# Patient Record
Sex: MALE | Race: WHITE | NOT HISPANIC OR LATINO | Employment: OTHER | URBAN - NONMETROPOLITAN AREA
[De-identification: names, ages, dates, MRNs, and addresses within clinical notes are randomized per-mention and may not be internally consistent; named-entity substitution may affect disease eponyms.]

---

## 2017-04-14 ENCOUNTER — OFFICE VISIT (OUTPATIENT)
Dept: CARDIOLOGY | Facility: CLINIC | Age: 62
End: 2017-04-14

## 2017-04-14 VITALS
SYSTOLIC BLOOD PRESSURE: 122 MMHG | DIASTOLIC BLOOD PRESSURE: 80 MMHG | BODY MASS INDEX: 32.51 KG/M2 | HEART RATE: 80 BPM | HEIGHT: 72 IN | WEIGHT: 240 LBS

## 2017-04-14 DIAGNOSIS — I49.5 SSS (SICK SINUS SYNDROME) (HCC): Primary | ICD-10-CM

## 2017-04-14 DIAGNOSIS — I36.1 NON-RHEUMATIC TRICUSPID VALVE INSUFFICIENCY: ICD-10-CM

## 2017-04-14 DIAGNOSIS — R55 VASOVAGAL SYNCOPE: ICD-10-CM

## 2017-04-14 DIAGNOSIS — I48.0 PAROXYSMAL A-FIB (HCC): ICD-10-CM

## 2017-04-14 PROCEDURE — 99213 OFFICE O/P EST LOW 20 MIN: CPT | Performed by: INTERNAL MEDICINE

## 2017-04-14 RX ORDER — ASPIRIN 325 MG
325 TABLET ORAL DAILY
COMMUNITY
End: 2020-06-02 | Stop reason: HOSPADM

## 2017-04-14 NOTE — PROGRESS NOTES
Baljinder Blanca  61 y.o. male    04/14/2017  1. SSS (sick sinus syndrome)    2. Paroxysmal a-fib    3. Vasovagal syncope    4. Non-rheumatic tricuspid valve insufficiency        History of Present Illness: Routine follow-up    61 years old patient with a past medical history of sick sinus syndrome status post pacemaker implantation.  History of for near syncopal episode in upright postures got better with increasing water and salt intake.  Recent pacemaker interrogation of good sensing and pacing threshold.  He had history of varicose vein, ablation by  Dr. Deny Freitas had some trouble and evaluated by me in clinic [place] and scheduled to have procedure done next month.  She denies orthopnea, PND, nauseous, vomiting, diarrhea.  Last echocardiographic study normal left and a systolic function with a mild tricuspid regurgitation without any hemodynamic significance.            SUBJECTIVE    No Known Allergies      History reviewed. No pertinent past medical history.      Past Surgical History:   Procedure Laterality Date   • GALLBLADDER SURGERY     • INSERT / REPLACE / REMOVE PACEMAKER     • SKIN LESION EXCISION     • VASECTOMY     • VEIN SURGERY           No family history on file.      Social History     Social History   • Marital status:      Spouse name: N/A   • Number of children: N/A   • Years of education: N/A     Occupational History   • Not on file.     Social History Main Topics   • Smoking status: Never Smoker   • Smokeless tobacco: Current User     Types: Snuff   • Alcohol use No   • Drug use: No   • Sexual activity: Defer     Other Topics Concern   • Not on file     Social History Narrative   • No narrative on file         Current Outpatient Prescriptions   Medication Sig Dispense Refill   • aspirin 325 MG tablet Take 325 mg by mouth Daily.     • dronedarone (MULTAQ) 400 MG tablet Take 1 tablet by mouth 2 (Two) Times a Day. 60 tablet 6   • Multiple Vitamins-Minerals (MULTIVITAL PO) Take 1  "tablet by mouth Daily.       No current facility-administered medications for this visit.          OBJECTIVE    /80  Pulse 80  Ht 72\" (182.9 cm)  Wt 240 lb (109 kg)  BMI 32.55 kg/m2        Review of Systems     Constitutional:  Denies recent weight loss, weight gain, fever or chills, no change in exercise tolerance     HENT:  Denies any hearing loss, epistaxis, hoarseness, or difficulty speaking.     Eyes: No blurring      Respiratory:  Denies dyspnea with exertion,no cough, wheezing, or hemoptysis.     Cardiovascular: The H&P     Gastrointestinal:  Denies change in bowel habits, dyspepsia, ulcer disease, hematochezia, or melena.     Endocrine: Negative for cold intolerance, heat intolerance, polydipsia, polyphagia and polyuria. Denies any history of weight change, heat/cold intolerance, polydipsia, polyuria     Genitourinary: Negative.      Musculoskeletal: Denies any history of arthritic symptoms or back problems     Skin:  Denies any change in hair or nails, rashes, or skin lesions.     Allergic/Immunologic: Negative.  Negative for environmental allergies, food allergies and immunocompromised state.     Neurological:  Denies any history of recurrent headaches, strokes, TIA, or seizure disorder.     Hematological: Denies any food allergies, seasonal allergies, bleeding disorders, or lymphadenopathy.     Psychiatric/Behavioral: Denies any history of depression, substance abuse, or change in cognitive function.         Physical Exam     Constitutional: Cooperative, alert and oriented, well-developed, well-nourished, in no acute distress.     HENT:   Head: Normocephalic, normal hair patterns, no masses or tenderness.  Ears, Nose, and Throat: No gross abnormalities. No pallor or cyanosis. Dentition good.   Eyes: EOMS intact, PERRL, conjunctivae and lids unremarkable. Fundoscopic exam and visual fields not performed.   Neck: No palpable masses or adenopathy, no thyromegaly, no JVD, carotid pulses are full and " equal bilaterally and without  Bruits.     Cardiovascular: Regular rhythm, S1 and S2 normal, no S3 or S4. Apical impulse not displaced. No murmurs, gallops, or rubs detected.     Pulmonary/Chest: Chest: normal symmetry, no tenderness to palpation, normal respiratory excursion, no intercostal retraction, no use of accessory muscles.            Pulmonary: Normal breath sounds. No rales or ronchi.    Abdominal: Abdomen soft, bowel sounds normoactive, no masses, no hepatosplenomegaly, non-tender, no bruits.     Musculoskeletal: No deformities, clubbing, cyanosis, erythema, or edema observed. There are no spinal abnormalities noted. Normal muscle strength and tone. Pulses full and equal in all extremities, no bruits auscultated.     Neurological: No gross motor or sensory deficits noted, affect appropriate, oriented to time, person, place.     Skin: Warm and dry to the touch, no apparent skin lesions or masses noted.     Psychiatric: He has a normal mood and affect. His behavior is normal. Judgment and thought content normal.         Procedures      No results found for: WBC, HGB, HCT, MCV, PLT  Lab Results   Component Value Date    ALBUMIN 4.5 01/29/2016    AST 33 01/29/2016    ALT 26 01/29/2016     No results found for: CHOL  No results found for: TRIG  No results found for: HDL  No results found for: LDLCALC  No results found for: LDL  No results found for: HDLLDLRATIO  No components found for: CHOLHDL  No results found for: HGBA1C  Lab Results   Component Value Date    TSH 1.97 01/29/2016           ASSESSMENT AND PLAN  #1sick sinus syndrome status post pacemaker implantation #2 paroxysmal atrial fibrillation continued sinus rhythm.    Clinically there is no sign of any acute cardiac decompensation based the clinical history physical finding.  Evidence of active ischemia.  Dizziness got significant better of for after salt and water intake.  He will continue his present medication which Multaq to keep  sinus rhythm.   No change was made in the medical management as his cardiac status seemed to well compensated.  We'll arrange hepatic profile in 6 month    Diagnoses and all orders for this visit:    SSS (sick sinus syndrome)    Paroxysmal a-fib    Vasovagal syncope    Non-rheumatic tricuspid valve insufficiency        Gautam Ramos MD  4/14/2017  9:45 AM

## 2017-06-28 ENCOUNTER — CLINICAL SUPPORT (OUTPATIENT)
Dept: CARDIOLOGY | Facility: CLINIC | Age: 62
End: 2017-06-28

## 2017-06-28 DIAGNOSIS — I49.5 SSS (SICK SINUS SYNDROME) (HCC): Primary | ICD-10-CM

## 2017-06-28 PROCEDURE — 93288 INTERROG EVL PM/LDLS PM IP: CPT | Performed by: INTERNAL MEDICINE

## 2017-06-28 NOTE — PROGRESS NOTES
62 years old patient with a sick sinus syndrome status post pacemaker implantation.  Manufacture [employer] model number p.m. 2210 and serial number [id number].  Implantation date 8/2/2011 and date of interrogation 6/28/2017.  Battery voltage good for 2-4 year.  Pacing the atrium about 10% ventricle 50% of the time.  Pacing programmed to DDD at 60 and 120.  AV delay 250 and PV delay to 25.  Sensing P wave 1 sensing R wave 9 with a lead impedance 390 threshold 1.2 at 0.5.  Had history of paroxysmal 3 fibrillation and currently in A. fib.  Otherwise normal functioning pacemaker and an pacemaker programmed from DDD to VVI

## 2017-09-13 ENCOUNTER — CLINICAL SUPPORT (OUTPATIENT)
Dept: CARDIOLOGY | Facility: CLINIC | Age: 62
End: 2017-09-13

## 2017-09-13 ENCOUNTER — DOCUMENTATION (OUTPATIENT)
Dept: CARDIOLOGY | Facility: CLINIC | Age: 62
End: 2017-09-13

## 2017-09-13 DIAGNOSIS — I49.5 SSS (SICK SINUS SYNDROME) (HCC): Primary | ICD-10-CM

## 2017-09-13 PROCEDURE — 93288 INTERROG EVL PM/LDLS PM IP: CPT | Performed by: INTERNAL MEDICINE

## 2017-09-18 NOTE — PROGRESS NOTES
Baljinder Blanca is a 62-year-old male with sick sinus syndrome status post pacemaker implantation on 8/2/2011  The patient has a [employer]ent DR RF 2210 pacemaker  Battery status was adequate at 2.9 to be an estimated longevity was 7.6-8.2 years.  The pacing mode was VVIR rate 60 bpm.  Ventricular lead sensing was 11.5 mV and threshold 1V at 0.5 ms and impedance 440 ohms.  No atrial or ventricular episodes were noted.  [employer] security upgrade was completed.  No changes made.  Pacemaker functioning well.

## 2017-10-13 ENCOUNTER — OFFICE VISIT (OUTPATIENT)
Dept: CARDIOLOGY | Facility: CLINIC | Age: 62
End: 2017-10-13

## 2017-10-13 VITALS
WEIGHT: 237 LBS | HEIGHT: 72 IN | SYSTOLIC BLOOD PRESSURE: 120 MMHG | HEART RATE: 60 BPM | DIASTOLIC BLOOD PRESSURE: 75 MMHG | BODY MASS INDEX: 32.1 KG/M2

## 2017-10-13 DIAGNOSIS — I49.5 SSS (SICK SINUS SYNDROME) (HCC): Primary | ICD-10-CM

## 2017-10-13 DIAGNOSIS — I48.0 PAROXYSMAL A-FIB (HCC): ICD-10-CM

## 2017-10-13 DIAGNOSIS — I36.1 NON-RHEUMATIC TRICUSPID VALVE INSUFFICIENCY: ICD-10-CM

## 2017-10-13 DIAGNOSIS — R55 VASOVAGAL SYNCOPE: ICD-10-CM

## 2017-10-13 PROCEDURE — 99213 OFFICE O/P EST LOW 20 MIN: CPT | Performed by: INTERNAL MEDICINE

## 2017-10-13 PROCEDURE — 93000 ELECTROCARDIOGRAM COMPLETE: CPT | Performed by: INTERNAL MEDICINE

## 2017-10-13 RX ORDER — TAMSULOSIN HYDROCHLORIDE 0.4 MG/1
CAPSULE ORAL
Refills: 0 | COMMUNITY
Start: 2017-10-10 | End: 2018-04-13

## 2017-10-13 NOTE — PROGRESS NOTES
Baljinder Blanca  62 y.o. male    10/13/2017  1. SSS (sick sinus syndrome)    2. Paroxysmal a-fib    3. Vasovagal syncope    4. Non-rheumatic tricuspid valve insufficiency        History of Present Illness    61 years old patient with a past medical history of sick sinus syndrome status post pacemaker implantation.  History of for near syncopal episode in upright postures got better with increasing water and salt intake.  Recent pacemaker interrogation of good sensing and pacing threshold.  He had history of varicose vein, ablation by  Dr. Deny Freitas had some trouble and evaluated in [facility] and ablation performed with a significant improvement in leg complaint.   She denies orthopnea, PND, nauseous, vomiting, diarrhea.  Last echocardiographic study normal left systolic function with  mild tricuspid regurgitation without any hemodynamic significance.        SUBJECTIVE    No Known Allergies      Past Medical History:   Diagnosis Date   • Arrhythmia    • Atrial fibrillation    • SSS (sick sinus syndrome)          Past Surgical History:   Procedure Laterality Date   • GALLBLADDER SURGERY     • INSERT / REPLACE / REMOVE PACEMAKER     • SKIN LESION EXCISION     • VASECTOMY     • VEIN SURGERY           Family History   Problem Relation Age of Onset   • No Known Problems Mother    • No Known Problems Father          Social History     Social History   • Marital status:      Spouse name: N/A   • Number of children: N/A   • Years of education: N/A     Occupational History   • Not on file.     Social History Main Topics   • Smoking status: Never Smoker   • Smokeless tobacco: Current User     Types: Snuff   • Alcohol use No   • Drug use: No   • Sexual activity: Defer     Other Topics Concern   • Not on file     Social History Narrative         Current Outpatient Prescriptions   Medication Sig Dispense Refill   • aspirin 325 MG tablet Take 325 mg by mouth Daily.     • dronedarone (MULTAQ) 400 MG tablet Take  "1 tablet by mouth 2 (Two) Times a Day. 60 tablet 6   • Multiple Vitamins-Minerals (MULTIVITAL PO) Take 1 tablet by mouth Daily.     • tamsulosin (FLOMAX) 0.4 MG capsule 24 hr capsule   0     No current facility-administered medications for this visit.          OBJECTIVE    /75  Pulse 60  Ht 72\" (182.9 cm)  Wt 237 lb (108 kg)  BMI 32.14 kg/m2        Review of Systems     Constitutional:  Denies recent weight loss, weight gain, fever or chills, no change in exercise tolerance     HENT:  Denies any hearing loss, epistaxis, hoarseness, or difficulty speaking.     Eyes: Wears eyeglasses or contact lenses     Respiratory:  Denies dyspnea with exertion,no cough, wheezing, or hemoptysis.     Cardiovascular: See H&P     Gastrointestinal:  Denies change in bowel habits, dyspepsia, ulcer disease, hematochezia, or melena.     Endocrine: Negative for cold intolerance, heat intolerance, polydipsia, polyphagia and polyuria. Denies any history of weight change, heat/cold intolerance, polydipsia, polyuria     Genitourinary: Negative.      Musculoskeletal: History of osteoarthritis    Skin:  Denies any change in hair or nails, rashes, or skin lesions.     Allergic/Immunologic: Negative.  Negative for environmental allergies, food allergies and immunocompromised state.     Neurological:  Denies any history of recurrent headaches, strokes, TIA, or seizure disorder.     Hematological: Denies any food allergies, seasonal allergies, bleeding disorders, or lymphadenopathy.     Psychiatric/Behavioral: Denies any history of depression, substance abuse, or change in cognitive function.         Physical Exam     Constitutional: Cooperative, alert and oriented, well-developed, well-nourished, in no acute distress.     HENT:   Head: Normocephalic, normal hair patterns, no masses or tenderness.  Ears, Nose, and Throat: No gross abnormalities. No pallor or cyanosis. Dentition good.   Eyes: EOMS intact, PERRL, conjunctivae and lids " unremarkable. Fundoscopic exam and visual fields not performed.   Neck: No palpable masses or adenopathy, no thyromegaly, no JVD, carotid pulses are full and equal bilaterally and without  Bruits.     Cardiovascular: Regular rhythm, S1 and S2 normal, no S3 or S4. Apical impulse not displaced. No murmurs, gallops, or rubs detected.     Pulmonary/Chest: Chest: normal symmetry, no tenderness to palpation, normal respiratory excursion, no intercostal retraction, no use of accessory muscles.            Pulmonary: Normal breath sounds. No rales or ronchi.    Abdominal: Abdomen soft, bowel sounds normoactive, no masses, no hepatosplenomegaly, non-tender, no bruits.     Musculoskeletal: No deformities, clubbing, cyanosis, erythema, or edema observed. There are no spinal abnormalities noted. Normal muscle strength and tone. Pulses full and equal in all extremities, no bruits auscultated.     Neurological: No gross motor or sensory deficits noted, affect appropriate, oriented to time, person, place.     Skin: Warm and dry to the touch, no apparent skin lesions or masses noted.     Psychiatric: He has a normal mood and affect. His behavior is normal. Judgment and thought content normal.           ECG 12 Lead  Date/Time: 10/13/2017 8:48 AM  Performed by: DMITRY BAUTISTA  Authorized by: DMITRY BAUTISTA   Comparison: not compared with previous ECG   Rhythm: sinus rhythm and paced              No results found for: WBC, HGB, HCT, MCV, PLT  Lab Results   Component Value Date    ALBUMIN 4.5 01/29/2016    AST 33 01/29/2016    ALT 26 01/29/2016     No results found for: CHOL  No results found for: TRIG  No results found for: HDL  No results found for: LDLCALC  No results found for: LDL  No results found for: HDLLDLRATIO  No components found for: CHOLHDL  No results found for: HGBA1C  Lab Results   Component Value Date    TSH 1.97 01/29/2016           ASSESSMENT AND PLAN  #1sick sinus syndrome status post pacemaker implantation #2  paroxysmal atrial fibrillation continued sinus rhythm.     Clinically there is no sign of any acute cardiac decompensation based the clinical history physical finding.  No evidence of active ischemia.  Dizziness got significant better of for after salt and water intake.  He will continue his present medication which Multaq to keep  sinus rhythm.  No change was made in the medical management as his cardiac status seemed to well compensated.   continue aspirin given the history of paroxysmal atrial fibrillation and low Victor Manuel vas Scoring.  Continue Multaq for management of paroxysmal atrial fibrillation currently sinus rhythm.  Lifestyle modification discussed with the patient.Dash diet explained    Baljinder was seen today for follow-up.    Diagnoses and all orders for this visit:    SSS (sick sinus syndrome)  -     ECG 12 Lead    Paroxysmal a-fib  -     ECG 12 Lead    Vasovagal syncope    Non-rheumatic tricuspid valve insufficiency        Gautam Ramos MD  10/13/2017  8:45 AM

## 2018-03-14 ENCOUNTER — CLINICAL SUPPORT (OUTPATIENT)
Dept: CARDIOLOGY | Facility: CLINIC | Age: 63
End: 2018-03-14

## 2018-03-14 DIAGNOSIS — Z95.0 PACEMAKER: ICD-10-CM

## 2018-03-14 DIAGNOSIS — I49.5 SSS (SICK SINUS SYNDROME) (HCC): Primary | ICD-10-CM

## 2018-03-14 PROCEDURE — 93288 INTERROG EVL PM/LDLS PM IP: CPT | Performed by: NURSE PRACTITIONER

## 2018-03-14 NOTE — PROGRESS NOTES
Pacemaker Evaluation Report    March 14, 2018    Primary Cardiologist: Richard  Implanting MD: Richard  : [employer]  Model: Accent DR RF 2210 Serial Number: [id number]  Implant date: 8/2/2011    Reason for evaluation:Routine  Cardiac device indication(s): SSS    Battery  HUMA: 2.92V   7.5-7.9 years   Last charge: N/A    Interrogation Results  Ventricular sensing: R wave: 9.6 mV  Ventricular capture: 1.25 V @ 0.5 ms  Ventricular lead impedance: right  430 ohms;     Parameters  Mode: VVIR  Base Rate: 60    Diagnostic Data  Ventricular paced: 29%  Mode switch: N/A    Changes made: Device  upgrade    Conclusions: 1 HVR episode 25 beats RVR on 9/30/17    Assessment:  1. SSS (sick sinus syndrome)    2. Pacemaker               This document has been electronically signed by MIGUE Curtis on March 14, 2018 4:54 PM

## 2018-04-13 ENCOUNTER — OFFICE VISIT (OUTPATIENT)
Dept: CARDIOLOGY | Facility: CLINIC | Age: 63
End: 2018-04-13

## 2018-04-13 VITALS
DIASTOLIC BLOOD PRESSURE: 80 MMHG | WEIGHT: 240 LBS | HEART RATE: 72 BPM | SYSTOLIC BLOOD PRESSURE: 128 MMHG | HEIGHT: 72 IN | BODY MASS INDEX: 32.51 KG/M2

## 2018-04-13 DIAGNOSIS — I48.0 PAROXYSMAL A-FIB (HCC): Primary | ICD-10-CM

## 2018-04-13 DIAGNOSIS — I36.1 NON-RHEUMATIC TRICUSPID VALVE INSUFFICIENCY: ICD-10-CM

## 2018-04-13 DIAGNOSIS — I49.5 SSS (SICK SINUS SYNDROME) (HCC): ICD-10-CM

## 2018-04-13 DIAGNOSIS — R55 VASOVAGAL SYNCOPE: ICD-10-CM

## 2018-04-13 PROCEDURE — 99213 OFFICE O/P EST LOW 20 MIN: CPT | Performed by: INTERNAL MEDICINE

## 2018-04-13 PROCEDURE — 93000 ELECTROCARDIOGRAM COMPLETE: CPT | Performed by: INTERNAL MEDICINE

## 2018-04-13 NOTE — PROGRESS NOTES
Baljinder Blanca  62 y.o. male    04/13/2018  1. Paroxysmal a-fib    2. Vasovagal syncope    3. Non-rheumatic tricuspid valve insufficiency    4. SSS (sick sinus syndrome)        History of Present Illness      62 years old patient with a past medical history of sick sinus syndrome status post pacemaker implantation.  History of for near syncopal episode in upright postures got better with increasing water and salt intake.  .  He had history of varicose vein, ablation by  Dr. Deny Freitas had some trouble and evaluated in [facility] and ablation performed with a significant improvement in leg complaint.   he denies orthopnea, PND, nauseous, vomiting, diarrhea.  Last echocardiographic study 2015 normal left systolic function with  mild tricuspid regurgitation without any hemodynamic significance.EKG done and finding discussed with the patient.  1/2016  Total Protein 6.3 - 8.6 gm/dl 7.8    Albumin 3.4 - 4.8 gm/dl 4.5    Bilirubin, Direct 0.0 - 0.3 mg/dl 0.0    Total Bilirubin 0.2 - 1.3 mg/dl 0.6    Alkaline Phosphatase 38 - 126 U/L 115    AST (SGOT) 17 - 59 U/L 33    ALT (SGPT) 21 - 72 U/L 26    Resulting Agency  [facility]       SUBJECTIVE    No Known Allergies      Past Medical History:   Diagnosis Date   • Arrhythmia    • Atrial fibrillation    • SSS (sick sinus syndrome)          Past Surgical History:   Procedure Laterality Date   • GALLBLADDER SURGERY     • INSERT / REPLACE / REMOVE PACEMAKER     • SKIN LESION EXCISION     • VASECTOMY     • VEIN SURGERY           Family History   Problem Relation Age of Onset   • No Known Problems Mother    • No Known Problems Father          Social History     Social History   • Marital status:      Spouse name: N/A   • Number of children: N/A   • Years of education: N/A     Occupational History   • Not on file.     Social History Main Topics   • Smoking status: Never Smoker   • Smokeless tobacco: Current User     Types: Snuff   • Alcohol use No   • Drug use: No  "  • Sexual activity: Defer     Other Topics Concern   • Not on file     Social History Narrative   • No narrative on file         Current Outpatient Prescriptions   Medication Sig Dispense Refill   • aspirin 325 MG tablet Take 325 mg by mouth Daily.     • dronedarone (MULTAQ) 400 MG tablet Take 1 tablet by mouth 2 (Two) Times a Day. 60 tablet 6   • Multiple Vitamins-Minerals (MULTIVITAL PO) Take 1 tablet by mouth Daily.       No current facility-administered medications for this visit.          OBJECTIVE    /80   Pulse 72   Ht 182.9 cm (72\")   Wt 109 kg (240 lb)   BMI 32.55 kg/m²         Review of Systems     Constitutional:  Denies recent weight loss, weight gain, fever or chills, no change in exercise tolerance     HENT:  Denies any hearing loss, epistaxis, hoarseness, or difficulty speaking.     Eyes: Wears eyeglasses or contact lenses     Respiratory:  Denies dyspnea with exertion,no cough, wheezing, or hemoptysis.     Cardiovascular: Negative for palpations, chest pain, orthopnea, PND, peripheral edema, syncope, or claudication.     Gastrointestinal:  Denies change in bowel habits, dyspepsia, ulcer disease, hematochezia, or melena.     Endocrine: Negative for cold intolerance, heat intolerance, polydipsia, polyphagia and polyuria. Denies any history of weight change, heat/cold intolerance, polydipsia, polyuria     Genitourinary: Negative.      Musculoskeletal: Denies any history of arthritic symptoms or back problems     Skin:  Denies any change in hair or nails, rashes, or skin lesions.     Allergic/Immunologic: Negative.  Negative for environmental allergies, food allergies and immunocompromised state.     Neurological:  Denies any history of recurrent headaches, strokes, TIA, or seizure disorder.     Hematological: Denies any food allergies, seasonal allergies, bleeding disorders, or lymphadenopathy.     Psychiatric/Behavioral: Denies any history of depression, substance abuse, or change in " cognitive function.         Physical Exam     Constitutional: Cooperative, alert and oriented, well-developed, well-nourished, in no acute distress.     HENT:   Head: Normocephalic, normal hair patterns, no masses or tenderness.  Ears, Nose, and Throat: No gross abnormalities. No pallor or cyanosis. Dentition good.   Eyes: EOMS intact, PERRL, conjunctivae and lids unremarkable. Fundoscopic exam and visual fields not performed.   Neck: No palpable masses or adenopathy, no thyromegaly, no JVD, carotid pulses are full and equal bilaterally and without  Bruits.     Cardiovascular: Regular rhythm, S1 and S2 normal, no S3 or S4. Apical impulse not displaced. No murmurs, gallops, or rubs detected.     Pulmonary/Chest: Chest: normal symmetry, no tenderness to palpation, normal respiratory excursion, no intercostal retraction, no use of accessory muscles.            Pulmonary: Normal breath sounds. No rales or ronchi.    Abdominal: Abdomen soft, bowel sounds normoactive, no masses, no hepatosplenomegaly, non-tender, no bruits.     Musculoskeletal: No deformities, clubbing, cyanosis, erythema, or edema observed. There are no spinal abnormalities noted. Normal muscle strength and tone. Pulses full and equal in all extremities, no bruits auscultated.     Neurological: No gross motor or sensory deficits noted, affect appropriate, oriented to time, person, place.     Skin: Warm and dry to the touch, no apparent skin lesions or masses noted.     Psychiatric: He has a normal mood and affect. His behavior is normal. Judgment and thought content normal.           ECG 12 Lead  Date/Time: 4/13/2018 8:45 AM  Performed by: DMITRY BAUTISTA  Authorized by: DMITRY BAUTISTA   Comparison: not compared with previous ECG   Rhythm: sinus rhythm              No results found for: WBC, HGB, HCT, MCV, PLT  Lab Results   Component Value Date    ALBUMIN 4.5 01/29/2016    AST 33 01/29/2016    ALT 26 01/29/2016     No results found for: CHOL  No  results found for: TRIG  No results found for: HDL  No components found for: LDLCALC  No results found for: LDL  No results found for: HDLLDLRATIO  No components found for: CHOLHDL  No results found for: HGBA1C  Lab Results   Component Value Date    TSH 1.97 01/29/2016           ASSESSMENT AND PLAN  #1sick sinus syndrome status post pacemaker implantation #2 paroxysmal atrial fibrillation continued sinus rhythm.     Clinically there is no sign of any acute cardiac decompensation based the clinical history physical finding.  No evidence of active ischemia.  Dizziness got significant better of for after salt and water intake.  He will continue his present medication which Multaq to keep  sinus rhythm.  No change was made in the medical management as his cardiac status seemed to well compensated.   continue aspirin given the history of paroxysmal atrial fibrillation and low Victor Manuel vas Scoring.  Continue Multaq for management of paroxysmal atrial fibrillation currently sinus rhythm.  Lifestyle modification discussed with the patient.Dash diet explained    Baljinder was seen today for follow-up.    Diagnoses and all orders for this visit:    Paroxysmal a-fib    Vasovagal syncope    Non-rheumatic tricuspid valve insufficiency    SSS (sick sinus syndrome)        Gautam Ramos MD  4/13/2018  8:40 AM

## 2018-04-25 RX ORDER — DRONEDARONE 400 MG/1
TABLET, FILM COATED ORAL
Qty: 60 TABLET | Refills: 6 | Status: SHIPPED | OUTPATIENT
Start: 2018-04-25 | End: 2018-06-29

## 2018-06-29 RX ORDER — FLECAINIDE ACETATE 50 MG/1
50 TABLET ORAL EVERY 12 HOURS
Qty: 60 TABLET | Refills: 6 | Status: SHIPPED | OUTPATIENT
Start: 2018-06-29 | End: 2018-10-17

## 2018-06-29 RX ORDER — DILTIAZEM HYDROCHLORIDE 120 MG/1
120 CAPSULE, COATED, EXTENDED RELEASE ORAL DAILY
COMMUNITY
End: 2018-06-29 | Stop reason: SDUPTHER

## 2018-06-29 RX ORDER — FLECAINIDE ACETATE 50 MG/1
50 TABLET ORAL EVERY 12 HOURS
COMMUNITY
End: 2018-06-29 | Stop reason: SDUPTHER

## 2018-06-29 RX ORDER — DILTIAZEM HYDROCHLORIDE 120 MG/1
120 CAPSULE, COATED, EXTENDED RELEASE ORAL DAILY
Qty: 30 CAPSULE | Refills: 6 | Status: SHIPPED | OUTPATIENT
Start: 2018-06-29 | End: 2019-04-23

## 2018-09-12 ENCOUNTER — CLINICAL SUPPORT (OUTPATIENT)
Dept: CARDIOLOGY | Facility: CLINIC | Age: 63
End: 2018-09-12

## 2018-09-12 DIAGNOSIS — I49.5 SSS (SICK SINUS SYNDROME) (HCC): Primary | ICD-10-CM

## 2018-09-12 DIAGNOSIS — Z95.0 PACEMAKER: ICD-10-CM

## 2018-09-12 PROCEDURE — 93288 INTERROG EVL PM/LDLS PM IP: CPT | Performed by: NURSE PRACTITIONER

## 2018-09-12 NOTE — PROGRESS NOTES
Pacemaker Evaluation Report    September 12, 2018    Primary Cardiologist: Dr Ramos  Implanting MD: Dr. Ramos  :[employer]    Model: Accent DR RF 2210  Serial Number: [id number]  Implant date: 8/2/2011    Reason for evaluation:routine  Office  PPM  Cardiac device indication(s): sick sinus syndrome    Battery  HUMA: 5.9-6.3 years 2.89v   Last charge: 0    Interrogation Results  Ventricular sensing: R wave: 11.1 mV  Ventricular capture: 1 V @ 0.5 ms  Ventricular lead impedance: right  440 ohms    Parameters  Mode: VVIR  Base Rate: 60    Diagnostic Data  Ventricular paced: 30 %  Mode switch: 0%  AT/AF [place]: 0%  AHR: 0  VHR: 0    Changes made:  No changes     Conclusions: normal device function    Assessment:  1. SSS (sick sinus syndrome) (CMS/HCC)    2. Pacemaker              This document has been electronically signed by MIGUE Curtis on September 12, 2018 1:49 PM

## 2018-10-17 ENCOUNTER — OFFICE VISIT (OUTPATIENT)
Dept: CARDIOLOGY | Facility: CLINIC | Age: 63
End: 2018-10-17

## 2018-10-17 ENCOUNTER — DOCUMENTATION (OUTPATIENT)
Dept: CARDIOLOGY | Facility: CLINIC | Age: 63
End: 2018-10-17

## 2018-10-17 VITALS
HEIGHT: 72 IN | SYSTOLIC BLOOD PRESSURE: 150 MMHG | BODY MASS INDEX: 32.1 KG/M2 | DIASTOLIC BLOOD PRESSURE: 88 MMHG | HEART RATE: 76 BPM | WEIGHT: 237 LBS

## 2018-10-17 DIAGNOSIS — R55 VASOVAGAL SYNCOPE: ICD-10-CM

## 2018-10-17 DIAGNOSIS — I49.5 SSS (SICK SINUS SYNDROME) (HCC): ICD-10-CM

## 2018-10-17 DIAGNOSIS — I36.1 NON-RHEUMATIC TRICUSPID VALVE INSUFFICIENCY: ICD-10-CM

## 2018-10-17 DIAGNOSIS — I48.0 PAROXYSMAL A-FIB (HCC): Primary | ICD-10-CM

## 2018-10-17 PROCEDURE — 99213 OFFICE O/P EST LOW 20 MIN: CPT | Performed by: INTERNAL MEDICINE

## 2018-10-17 PROCEDURE — 93000 ELECTROCARDIOGRAM COMPLETE: CPT | Performed by: INTERNAL MEDICINE

## 2018-10-17 NOTE — PROGRESS NOTES
Baljinder Blanca  63 y.o. male    10/17/2018  1. Paroxysmal A-fib (CMS/HCC)    2. Vasovagal syncope    3. Non-rheumatic tricuspid valve insufficiency    4. SSS (sick sinus syndrome) (CMS/HCC)        History of Present Illness:  63 years old patient with BMI32, past medical history of sick sinus syndrome status post pacemaker implantation.  History of for near syncopal episode in upright postures got better with increasing water and salt intake.  .  He had history of varicose vein, ablation by  Dr. Deny Freitas had some trouble and evaluated in vein clinic [place] and ablation performed with a significant improvement in leg complaint.   he denies orthopnea, PND, nauseous, vomiting, diarrhea.  Last echocardiographic study 2015 normal left systolic function with  mild tricuspid regurgitation.EKG done and finding discussed with the patient.      1/2016  Total Protein 6.3 - 8.6 gm/dl 7.8    Albumin 3.4 - 4.8 gm/dl 4.5    Bilirubin, Direct 0.0 - 0.3 mg/dl 0.0    Total Bilirubin 0.2 - 1.3 mg/dl 0.6    Alkaline Phosphatase 38 - 126 U/L 115    AST (SGOT) 17 - 59 U/L 33    ALT (SGPT) 21 - 72 U/L 26    Resulting Agency   [facility]     Battery 11/2018  HUMA: 5.9-6.3 years 2.89v           Last charge: 0     Interrogation Results  Ventricular sensing: R wave: 11.1 mV  Ventricular capture: 1 V @ 0.5 ms  Ventricular lead impedance: right  440 ohms     Parameters  Mode: VVIR  Base Rate: 60     Diagnostic Data  Ventricular paced: 30 %  Mode switch: 0%  AT/AF [place]: 0%  AHR: 0  VHR: 0     Changes made:  No changes      Conclusions: normal device function     Assessment:  1. SSS (sick sinus syndrome) (CMS/HCC)    2. Pacemaker                SUBJECTIVE:    No Known Allergies      Past Medical History:   Diagnosis Date   • Arrhythmia    • Atrial fibrillation (CMS/HCC)    • SSS (sick sinus syndrome) (CMS/HCC)          Past Surgical History:   Procedure Laterality Date   • GALLBLADDER SURGERY     • INSERT / REPLACE / REMOVE PACEMAKER      • SKIN LESION EXCISION     • VASECTOMY     • VEIN SURGERY           Family History   Problem Relation Age of Onset   • No Known Problems Mother    • No Known Problems Father          Social History     Social History   • Marital status:      Spouse name: N/A   • Number of children: N/A   • Years of education: N/A     Occupational History   • Not on file.     Social History Main Topics   • Smoking status: Never Smoker   • Smokeless tobacco: Current User     Types: Snuff   • Alcohol use No   • Drug use: No   • Sexual activity: Defer     Other Topics Concern   • Not on file     Social History Narrative   • No narrative on file         Current Outpatient Prescriptions   Medication Sig Dispense Refill   • aspirin 325 MG tablet Take 325 mg by mouth Daily.     • dronedarone (MULTAQ) 400 MG tablet Take 400 mg by mouth.     • Multiple Vitamins-Minerals (MULTIVITAL PO) Take 1 tablet by mouth Daily.     • diltiaZEM CD (CARDIZEM CD) 120 MG 24 hr capsule Take 1 capsule by mouth Daily. 30 capsule 6     No current facility-administered medications for this visit.            Review of Systems:     Constitutional:  Denies recent weight loss, weight gain, fever or chills, no change in exercise tolerance.     HENT:  Denies any hearing loss, epistaxis, hoarseness, or difficulty speaking.     Eyes: Wears eyeglasses or contact lenses.    Respiratory:  Denies dyspnea with exertion,no cough, wheezing, or hemoptysis.     Cardiovascular: Negative for palpations, chest pain, orthopnea, PND, peripheral edema, syncope, or claudication.     Gastrointestinal:  Denies change in bowel habits, dyspepsia, ulcer disease, hematochezia, or melena.     Endocrine: Negative for cold intolerance, heat intolerance, polydipsia, polyphagia and polyuria. Denies any history of weight change, polydipsia, polyuria.     Genitourinary: Negative.      Musculoskeletal: Denies any history of arthritic symptoms or back problems.     Skin:  Denies any change in  "hair or nails, rashes, or skin lesions.     Allergic/Immunologic: Negative.  Negative for environmental allergies, food allergies and immunocompromised state.     Neurological:  Denies any history of recurrent headaches, strokes, TIA, or seizure disorder.     Hematological: Denies any food allergies, seasonal allergies, bleeding disorders, or lymphadenopathy.     Psychiatric/Behavioral: Denies any history of depression, substance abuse, or change in cognitive function.       OBJECTIVE:    /88   Pulse 76   Ht 182.9 cm (72\")   Wt 108 kg (237 lb)   BMI 32.14 kg/m²       Physical Exam:     Constitutional: Cooperative, alert and oriented, well-developed, well-nourished, in no acute distress.     HENT:   Head: Normocephalic, normal hair patterns, no masses or tenderness.  Ears, Nose, and Throat: No gross abnormalities. No pallor or cyanosis. Dentition good.   Eyes: EOMS intact, PERRL, conjunctivae and lids unremarkable. Fundoscopic exam and visual fields not performed.   Neck: No palpable masses or adenopathy, no thyromegaly, no JVD, carotid pulses are full and equal bilaterally and without  Bruits.     Cardiovascular: Regular rhythm, S1 and S2 normal, no S3 or S4. Apical impulse not displaced. No murmurs, gallops, or rubs detected.     Pulmonary/Chest: Chest: normal symmetry, no tenderness to palpation, normal respiratory excursion, no intercostal retraction, no use of accessory muscles. Pulmonary: Normal breath sounds. No rales or rhonchi.    Abdominal: Abdomen soft, bowel sounds normoactive, no masses, no hepatosplenomegaly, non-tender, no bruits.     Musculoskeletal: No deformities, clubbing, cyanosis, erythema, or edema observed. There are no spinal abnormalities noted. Normal muscle strength and tone. Pulses full and equal in all extremities, no bruits auscultated.     Neurological: No gross motor or sensory deficits noted, affect appropriate, oriented to time, person, place.     Skin: Warm and dry to the " touch, no apparent skin lesions or masses noted.     Psychiatric: He has a normal mood and affect. His behavior is normal. Judgment and thought content normal.           ECG 12 Lead  Date/Time: 10/17/2018 8:56 AM  Performed by: DMITRY BAUTISTA  Authorized by: DMITRY BAUTISTA   Comparison: not compared with previous ECG   Rhythm: sinus rhythm              No results found for: WBC, HGB, HCT, MCV, PLT  Lab Results   Component Value Date    ALBUMIN 4.5 01/29/2016    AST 33 01/29/2016    ALT 26 01/29/2016     No results found for: CHOL  No results found for: TRIG  No results found for: HDL  No components found for: LDLCALC  No results found for: LDL  No results found for: HDLLDLRATIO  No components found for: CHOLHDL  No results found for: HGBA1C  Lab Results   Component Value Date    TSH 1.97 01/29/2016           ASSESSMENT AND PLAN:    #1sick sinus syndrome status post pacemaker implantation #2 paroxysmal atrial fibrillation continued sinus rhythm.     Clinically there is no sign of any acute cardiac decompensation based the clinical history physical finding.  No evidence of active ischemia.  Dizziness got significant better  after salt and water intake.  He will continue his present medication which Multaq to keep  sinus rhythm.  No change was made in the medical management as his cardiac status seemed to well compensated.   continue aspirin given the history of paroxysmal atrial fibrillation given low [place] vas Scoring.  Continue Multaq for management of paroxysmal atrial fibrillation currently sinus rhythm.  Arrange an echocardiographic study to reassess the left ventricle size and function and tricuspid regurgitation given the last echo in 2015 prior to the next visit Lifestyle modification discussed with the patient.Dash diet explained  Baljinder was seen today for follow-up.    Diagnoses and all orders for this visit:    Paroxysmal A-fib (CMS/HCC)    Vasovagal syncope    Non-rheumatic tricuspid valve  insufficiency    SSS (sick sinus syndrome) (CMS/[facility])        Gautam Ramos MD  10/17/2018  8:44 AM

## 2018-10-17 NOTE — PROGRESS NOTES
Pt is set up on assistance for cecilio,  eligible until 07/2019  I sent a refill to the company today, his medication will come via mail to our office. We will have to send paperwork every 60 days for his refills. Pt verbalized understanding

## 2018-10-24 ENCOUNTER — DOCUMENTATION (OUTPATIENT)
Dept: CARDIOLOGY | Facility: CLINIC | Age: 63
End: 2018-10-24

## 2018-10-24 NOTE — PROGRESS NOTES
Called patient to let him know his Multaq is here.  He will come 10/29 and I told him to let the girls up front know and I will bring it up to him.  I also told him we have to fax in refill request every 60 days so when he gets close to being out he has to call me so I can fill out form and fax in.  Patient understood.  Packing list and refill request fax have been put in to scan.

## 2018-10-24 NOTE — PROGRESS NOTES
Previous note said patient received 6 month supply.  He received 3 month supply (Multaq).  Sig  was wrong in computer at once daily and has been corrected to BID.  Called patient and confirmed that he takes it BID.

## 2018-10-24 NOTE — PROGRESS NOTES
Addendum to previous note.....Patient received 6 month supply of Multaq.  3 bottles of 400mg #60per bottle.  In Media

## 2019-01-09 ENCOUNTER — DOCUMENTATION (OUTPATIENT)
Dept: CARDIOLOGY | Facility: CLINIC | Age: 64
End: 2019-01-09

## 2019-03-19 ENCOUNTER — CLINICAL SUPPORT (OUTPATIENT)
Dept: CARDIOLOGY | Facility: CLINIC | Age: 64
End: 2019-03-19

## 2019-03-19 ENCOUNTER — DOCUMENTATION (OUTPATIENT)
Dept: CARDIOLOGY | Facility: CLINIC | Age: 64
End: 2019-03-19

## 2019-03-19 DIAGNOSIS — I49.5 SSS (SICK SINUS SYNDROME) (HCC): Primary | ICD-10-CM

## 2019-03-19 DIAGNOSIS — Z95.0 PACEMAKER: ICD-10-CM

## 2019-03-19 PROCEDURE — 93288 INTERROG EVL PM/LDLS PM IP: CPT | Performed by: NURSE PRACTITIONER

## 2019-03-19 NOTE — PROGRESS NOTES
Pacemaker Evaluation Report    March 19, 2019    Primary Cardiologist: Dr Ramos  Implanting MD: Dr. Ramos  :[employer]    Model: [employer] DR RF 2210  Serial Number: [id number]  Implant date: 8/2/2011    Reason for evaluation:routine  Office  PPM  Cardiac device indication(s): sick sinus syndrome    Battery  HUMA: 5.2-5.6 years 2.87v     Interrogation Results  Ventricular sensing: R wave: 9.6 mV  Ventricular capture: 1.25 V @ 0.5 ms  Ventricular lead impedance: right  390 ohms    Parameters  Mode: VVIR  Base Rate: 60    Diagnostic Data  Ventricular paced: 27 %  Mode switch: 0%  AT/AF [place]: 0%  AHR: 0  VHR: 2     Changes made:  No changes     Conclusions: normal device function    Assessment:  1. SSS (sick sinus syndrome) (CMS/HCC)    2. Pacemaker          This document has been electronically signed by MIGUE España on March 19, 2019 9:36 AM

## 2019-03-27 ENCOUNTER — DOCUMENTATION (OUTPATIENT)
Dept: CARDIOLOGY | Facility: CLINIC | Age: 64
End: 2019-03-27

## 2019-04-23 ENCOUNTER — OFFICE VISIT (OUTPATIENT)
Dept: CARDIOLOGY | Facility: CLINIC | Age: 64
End: 2019-04-23

## 2019-04-23 VITALS
BODY MASS INDEX: 32.51 KG/M2 | SYSTOLIC BLOOD PRESSURE: 126 MMHG | DIASTOLIC BLOOD PRESSURE: 72 MMHG | WEIGHT: 240 LBS | HEART RATE: 80 BPM | HEIGHT: 72 IN | OXYGEN SATURATION: 99 %

## 2019-04-23 DIAGNOSIS — I36.1 NON-RHEUMATIC TRICUSPID VALVE INSUFFICIENCY: ICD-10-CM

## 2019-04-23 DIAGNOSIS — I48.0 PAROXYSMAL A-FIB (HCC): Primary | ICD-10-CM

## 2019-04-23 DIAGNOSIS — I49.5 SSS (SICK SINUS SYNDROME) (HCC): ICD-10-CM

## 2019-04-23 DIAGNOSIS — R55 VASOVAGAL SYNCOPE: ICD-10-CM

## 2019-04-23 PROCEDURE — 99213 OFFICE O/P EST LOW 20 MIN: CPT | Performed by: INTERNAL MEDICINE

## 2019-04-23 NOTE — PROGRESS NOTES
Baljinder Blanca  63 y.o. male    04/23/2019  1. Paroxysmal A-fib (CMS/HCC)    2. Vasovagal syncope    3. Non-rheumatic tricuspid valve insufficiency    4. SSS (sick sinus syndrome) (CMS/[facility])        History of Present Illness:    Patient's Body mass index is 32.55 kg/m². BMI is above normal parameters. Recommendations include: exercise counseling, nutrition counseling and referral to primary care.    63 years old patient  with  BMI3.6, past medical history of sick sinus syndrome status post pacemaker implantation.  History of for near syncopal episode in upright postures got better with increasing water and salt intake.  .  He had history of varicose vein, ablation by  Dr. Deny Freitas had some trouble and evaluated in vein clinic [place] and ablation performed with a significant improvement in leg complaint.   he denies orthopnea, PND, nauseous, vomiting, diarrhea.  Echo findings discussed with the patient good heart function.  Pacemaker interrogated in March 2019 good sensing and pacing threshold.  No arrhythmia noted    ECHO 10/2018  ·  Left ventricular wall thickness is consistent with mild concentric hypertrophy.  · Mild tricuspid valve regurgitation is present.  · Left ventricular systolic function is normal. Estimated EF = 60%.  Left ventricular diastolic dysfunction (grade I a) consistent with impaired relaxation.     1/2016  Total Protein 6.3 - 8.6 gm/dl 7.8    Albumin 3.4 - 4.8 gm/dl 4.5    Bilirubin, Direct 0.0 - 0.3 mg/dl 0.0    Total Bilirubin 0.2 - 1.3 mg/dl 0.6    Alkaline Phosphatase 38 - 126 U/L 115    AST (SGOT) 17 - 59 U/L 33    ALT (SGPT) 21 - 72 U/L 26    Resulting Agency           Battery 3/19/19  HUMA: 5.2-5.6 years 2.87v              Interrogation Results  Ventricular sensing: R wave: 9.6 mV  Ventricular capture: 1.25 V @ 0.5 ms  Ventricular lead impedance: right  390 ohms     Parameters  Mode: VVIR  Base Rate: 60     Diagnostic Data  Ventricular paced: 27 %  Mode switch: 0%  AT/AF [place]:  0%  AHR: 0  VHR: 2      Changes made:  No changes      Conclusions: normal device function     Assessment:  1. SSS (sick sinus syndrome) (CMS/HCC)    2. Pacemaker              SUBJECTIVE:    No Known Allergies      Past Medical History:   Diagnosis Date   • Arrhythmia    • Atrial fibrillation (CMS/HCC)    • SSS (sick sinus syndrome) (CMS/HCC)          Past Surgical History:   Procedure Laterality Date   • GALLBLADDER SURGERY     • INSERT / REPLACE / REMOVE PACEMAKER     • SKIN LESION EXCISION     • VASECTOMY     • VEIN SURGERY           Family History   Problem Relation Age of Onset   • No Known Problems Mother    • No Known Problems Father          Social History     Socioeconomic History   • Marital status:      Spouse name: Not on file   • Number of children: Not on file   • Years of education: Not on file   • Highest education level: Not on file   Tobacco Use   • Smoking status: Never Smoker   • Smokeless tobacco: Current User     Types: Snuff   Substance and Sexual Activity   • Alcohol use: No   • Drug use: No   • Sexual activity: Defer         Current Outpatient Medications   Medication Sig Dispense Refill   • aspirin 325 MG tablet Take 325 mg by mouth Daily.     • dronedarone (MULTAQ) 400 MG tablet Take 400 mg by mouth 2 (Two) Times a Day With Meals.     • Multiple Vitamins-Minerals (MULTIVITAL PO) Take 1 tablet by mouth Daily.       No current facility-administered medications for this visit.            Review of Systems:     Constitutional:  Denies recent weight loss, weight gain, fever or chills, no change in exercise tolerance.     HENT:  Denies any hearing loss, epistaxis, hoarseness, or difficulty speaking.     Eyes: No blurry.    Respiratory:  Denies dyspnea with exertion,no cough, wheezing, or hemoptysis.     Cardiovascular: See H&P     Gastrointestinal:  Denies change in bowel habits, dyspepsia, ulcer disease, hematochezia, or melena.     Endocrine: Negative for cold intolerance, heat  "intolerance, polydipsia, polyphagia and polyuria. Denies any history of weight change, polydipsia, polyuria.     Genitourinary: Negative.      Musculoskeletal: History of varicose vein    Skin:  Denies any change in hair or nails, rashes, or skin lesions.     Allergic/Immunologic: Negative.  Negative for environmental allergies, food allergies and immunocompromised state.     Neurological:  Denies any history of recurrent headaches, strokes, TIA, or seizure disorder.     Hematological: Denies any food allergies, seasonal allergies, bleeding disorders, or lymphadenopathy.     Psychiatric/Behavioral: Denies any history of depression, substance abuse, or change in cognitive function.       OBJECTIVE:    /72   Pulse 80   Ht 182.9 cm (72\")   Wt 109 kg (240 lb)   SpO2 99%   BMI 32.55 kg/m²       Physical Exam:     Constitutional: Cooperative, alert and oriented, well-developed, well-nourished, in no acute distress.     HENT:   Head: Normocephalic, normal hair patterns, no masses or tenderness.  Ears, Nose, and Throat: No gross abnormalities. No pallor or cyanosis. Dentition good.   Eyes: EOMS intact, PERRL, conjunctivae and lids unremarkable. Fundoscopic exam and visual fields not performed.   Neck: No palpable masses or adenopathy, no thyromegaly, no JVD, carotid pulses are full and equal bilaterally and without  Bruits.     Cardiovascular: Regular rhythm, S1 and S2 normal, no S3 or S4. Apical impulse not displaced. No murmurs, gallops, or rubs detected.     Pulmonary/Chest: Chest: normal symmetry, no tenderness to palpation, normal respiratory excursion, no intercostal retraction, no use of accessory muscles. Pulmonary: Normal breath sounds. No rales or rhonchi.    Abdominal: Abdomen soft, bowel sounds normoactive, no masses, no hepatosplenomegaly, non-tender, no bruits.     Musculoskeletal: No deformities, clubbing, cyanosis, erythema, or edema observed. There are no spinal abnormalities noted. Normal muscle " strength and tone. Pulses full and equal in all extremities, no bruits auscultated.     Neurological: No gross motor or sensory deficits noted, affect appropriate, oriented to time, person, place.     Skin: Warm and dry to the touch, no apparent skin lesions or masses noted.     Psychiatric: He has a normal mood and affect. His behavior is normal. Judgment and thought content normal.         Procedures      No results found for: WBC, HGB, HCT, MCV, PLT  Lab Results   Component Value Date    ALBUMIN 4.5 01/29/2016    AST 33 01/29/2016    ALT 26 01/29/2016     No results found for: CHOL  No results found for: TRIG  No results found for: HDL  No components found for: LDLCALC  No results found for: LDL  No results found for: HDLLDLRATIO  No components found for: CHOLHDL  No results found for: HGBA1C  Lab Results   Component Value Date    TSH 1.97 01/29/2016           ASSESSMENT AND PLAN:  #1sick sinus syndrome status post pacemaker implantation #2 paroxysmal atrial fibrillation continued sinus rhythm.     Clinically there is no sign of any acute cardiac decompensation based the clinical history physical finding.  No evidence of active ischemia.  Dizziness got significant better  after salt and water intake.  He will continue his present medication which Multaq to keep  sinus rhythm.  No change was made in the medical management as his cardiac status seemed to well compensated.   continue aspirin given the history of paroxysmal atrial fibrillation given low [id number] vas Scoring.  Continue Multaq for management of paroxysmal atrial fibrillation currently sinus rhythm..  The patient had blood work through the family doctor and we get the report to update our medical record.  Per patient his liver and other blood work  within normal limits        Baljinder was seen today for follow-up.    Diagnoses and all orders for this visit:    Paroxysmal A-fib (CMS/HCC)    Vasovagal syncope    Non-rheumatic tricuspid valve  insufficiency    SSS (sick sinus syndrome) (CMS/[facility])        Gautam Ramos MD  4/23/2019  9:25 AM

## 2019-06-05 ENCOUNTER — DOCUMENTATION (OUTPATIENT)
Dept: CARDIOLOGY | Facility: CLINIC | Age: 64
End: 2019-06-05

## 2019-07-26 DIAGNOSIS — I48.0 PAROXYSMAL A-FIB (HCC): Primary | ICD-10-CM

## 2019-08-01 ENCOUNTER — DOCUMENTATION (OUTPATIENT)
Dept: CARDIOLOGY | Facility: CLINIC | Age: 64
End: 2019-08-01

## 2019-10-25 ENCOUNTER — OFFICE VISIT (OUTPATIENT)
Dept: CARDIOLOGY | Facility: CLINIC | Age: 64
End: 2019-10-25

## 2019-10-25 VITALS
HEART RATE: 82 BPM | DIASTOLIC BLOOD PRESSURE: 72 MMHG | WEIGHT: 235 LBS | SYSTOLIC BLOOD PRESSURE: 134 MMHG | OXYGEN SATURATION: 99 % | BODY MASS INDEX: 31.83 KG/M2 | HEIGHT: 72 IN

## 2019-10-25 DIAGNOSIS — I48.0 PAROXYSMAL A-FIB (HCC): Primary | ICD-10-CM

## 2019-10-25 DIAGNOSIS — I36.1 NON-RHEUMATIC TRICUSPID VALVE INSUFFICIENCY: ICD-10-CM

## 2019-10-25 DIAGNOSIS — R55 VASOVAGAL SYNCOPE: ICD-10-CM

## 2019-10-25 DIAGNOSIS — I49.5 SSS (SICK SINUS SYNDROME) (HCC): ICD-10-CM

## 2019-10-25 PROCEDURE — 99213 OFFICE O/P EST LOW 20 MIN: CPT | Performed by: INTERNAL MEDICINE

## 2019-10-25 NOTE — PROGRESS NOTES
Baljinder Blanca  64 y.o. male    10/25/2019  1. Paroxysmal A-fib (CMS/HCC)    2. Vasovagal syncope    3. Non-rheumatic tricuspid valve insufficiency    4. SSS (sick sinus syndrome) (CMS/HCC)        History of Present Illness:    Body mass index is 31.87 kg/m². BMI is above normal parameters. Recommendations include: exercise counseling, nutrition counseling and referral to primary care.      64 years old patient  with   past medical history of sick sinus syndrome status post pacemaker implantation.  History of for near syncopal episode in upright postures got better with increasing water and salt intake.  .  He had history of varicose vein, ablation by  Dr. Deny Freitas had some trouble and evaluated in vein clinic [facility] and ablation performed with a significant improvement in leg complaint.   he denies orthopnea, PND, nauseous, vomiting, diarrhea.  Echo findings discussed with the patient good heart function.  Pacemaker interrogated in March 2019 good sensing and pacing threshold.  No arrhythmia noted     ECHO 10/2018  ·  Left ventricular wall thickness is consistent with mild concentric hypertrophy.  · Mild tricuspid valve regurgitation is present.  · Left ventricular systolic function is normal. Estimated EF = 60%.  Left ventricular diastolic dysfunction (grade I a) consistent with impaired relaxation.     1/2016  Total Protein 6.3 - 8.6 gm/dl 7.8    Albumin 3.4 - 4.8 gm/dl 4.5    Bilirubin, Direct 0.0 - 0.3 mg/dl 0.0    Total Bilirubin 0.2 - 1.3 mg/dl 0.6    Alkaline Phosphatase 38 - 126 U/L 115    AST (SGOT) 17 - 59 U/L 33    ALT (SGPT) 21 - 72 U/L 26    Resulting Agency               SUBJECTIVE:    No Known Allergies      Past Medical History:   Diagnosis Date   • Arrhythmia    • Atrial fibrillation (CMS/HCC)    • SSS (sick sinus syndrome) (CMS/HCC)          Past Surgical History:   Procedure Laterality Date   • GALLBLADDER SURGERY     • INSERT / REPLACE / REMOVE PACEMAKER     • SKIN LESION EXCISION     •  VASECTOMY     • VEIN SURGERY           Family History   Problem Relation Age of Onset   • No Known Problems Mother    • No Known Problems Father          Social History     Socioeconomic History   • Marital status:      Spouse name: Not on file   • Number of children: Not on file   • Years of education: Not on file   • Highest education level: Not on file   Tobacco Use   • Smoking status: Never Smoker   • Smokeless tobacco: Current User     Types: Snuff   Substance and Sexual Activity   • Alcohol use: No   • Drug use: No   • Sexual activity: Defer         Current Outpatient Medications   Medication Sig Dispense Refill   • aspirin 325 MG tablet Take 325 mg by mouth Daily.     • dronedarone (MULTAQ) 400 MG tablet Take 1 tablet by mouth 2 (Two) Times a Day With Meals. 180 tablet 3   • Multiple Vitamins-Minerals (MULTIVITAL PO) Take 1 tablet by mouth Daily.       No current facility-administered medications for this visit.            Review of Systems:     Constitutional:  Denies recent weight loss, weight gain, fever or chills, no change in exercise tolerance.     HENT:  Denies any hearing loss, epistaxis, hoarseness, or difficulty speaking.     Eyes: Wears eyeglasses or contact lenses.    Respiratory:  Denies dyspnea with exertion,no cough, wheezing, or hemoptysis.     Cardiovascular: Negative for palpations, chest pain, orthopnea, PND, peripheral edema, syncope, or claudication.     Gastrointestinal:  Denies change in bowel habits, dyspepsia, ulcer disease, hematochezia, or melena.     Endocrine: Negative for cold intolerance, heat intolerance, polydipsia, polyphagia and polyuria. Denies any history of weight change, polydipsia, polyuria.     Genitourinary: Negative.      Musculoskeletal: Denies any history of arthritic symptoms or back problems.     Skin:  Denies any change in hair or nails, rashes, or skin lesions.     Allergic/Immunologic: Negative.  Negative for environmental allergies, food allergies and  "immunocompromised state.     Neurological:  Denies any history of recurrent headaches, strokes, TIA, or seizure disorder.     Hematological: Denies any food allergies, seasonal allergies, bleeding disorders, or lymphadenopathy.     Psychiatric/Behavioral: Denies any history of depression, substance abuse, or change in cognitive function.       OBJECTIVE:    /72   Pulse 82   Ht 182.9 cm (72\")   Wt 107 kg (235 lb)   SpO2 99%   BMI 31.87 kg/m²       Physical Exam:     Constitutional: Cooperative, alert and oriented, well-developed, well-nourished, in no acute distress.     HENT:   Head: Normocephalic, normal hair patterns, no masses or tenderness.  Ears, Nose, and Throat: No gross abnormalities. No pallor or cyanosis. Dentition good.   Eyes: EOMS intact, PERRL, conjunctivae and lids unremarkable. Fundoscopic exam and visual fields not performed.   Neck: No palpable masses or adenopathy, no thyromegaly, no JVD, carotid pulses are full and equal bilaterally and without  Bruits.     Cardiovascular: Regular rhythm, S1 and S2 normal, no S3 or S4. Apical impulse not displaced. No murmurs, gallops, or rubs detected.     Pulmonary/Chest: Chest: normal symmetry, no tenderness to palpation, normal respiratory excursion, no intercostal retraction, no use of accessory muscles. Pulmonary: Normal breath sounds. No rales or rhonchi.    Abdominal: Abdomen soft, bowel sounds normoactive, no masses, no hepatosplenomegaly, non-tender, no bruits.     Musculoskeletal: No deformities, clubbing, cyanosis, erythema, or edema observed. There are no spinal abnormalities noted. Normal muscle strength and tone. Pulses full and equal in all extremities, no bruits auscultated.     Neurological: No gross motor or sensory deficits noted, affect appropriate, oriented to time, person, place.     Skin: Warm and dry to the touch, no apparent skin lesions or masses noted.     Psychiatric: He has a normal mood and affect. His behavior is normal. " Judgment and thought content normal.         Procedures      No results found for: WBC, HGB, HCT, MCV, PLT  Lab Results   Component Value Date    ALBUMIN 4.5 01/29/2016    AST 33 01/29/2016    ALT 26 01/29/2016     No results found for: CHOL  No results found for: TRIG  No results found for: HDL  No components found for: LDLCALC  No results found for: LDL  No results found for: HDLLDLRATIO  No components found for: CHOLHDL  No results found for: HGBA1C  Lab Results   Component Value Date    TSH 1.97 01/29/2016           ASSESSMENT AND PLAN:  #1sick sinus syndrome status post pacemaker implantation   #2 paroxysmal atrial fibrillation continued sinus rhythm.     Clinically there is no sign of any acute cardiac decompensation based the clinical history physical finding.  No evidence of active ischemia.  Dizziness got significant better  after salt and water intake.  He will continue his present medication which Multaq to keep  sinus rhythm.  No change was made in the medical management as his cardiac status seemed to well compensated.   continue aspirin given the history of paroxysmal atrial fibrillation given low [id number] vas Scoring.  Continue Multaq for management of paroxysmal atrial fibrillation currently sinus rhythm..      Baljinder was seen today for follow-up.    Diagnoses and all orders for this visit:    Paroxysmal A-fib (CMS/HCC)    Vasovagal syncope    Non-rheumatic tricuspid valve insufficiency    SSS (sick sinus syndrome) (CMS/HCC)          Gautam Ramos MD  10/25/2019  11:18 AM

## 2019-11-24 PROCEDURE — 93294 REM INTERROG EVL PM/LDLS PM: CPT | Performed by: NURSE PRACTITIONER

## 2019-11-26 ENCOUNTER — CLINICAL SUPPORT (OUTPATIENT)
Dept: CARDIOLOGY | Facility: CLINIC | Age: 64
End: 2019-11-26

## 2019-11-26 DIAGNOSIS — I49.5 SSS (SICK SINUS SYNDROME) (HCC): Primary | ICD-10-CM

## 2019-11-26 DIAGNOSIS — Z95.0 PACEMAKER: ICD-10-CM

## 2019-11-26 PROCEDURE — 93296 REM INTERROG EVL PM/IDS: CPT | Performed by: NURSE PRACTITIONER

## 2019-11-26 NOTE — PROGRESS NOTES
Pacemaker Evaluation Report    November 26, 2019    Primary Cardiologist: Dr Ramos  Implanting MD: Dr. Ramos  :[id number]    Model: [employer] DR RF 2210  Serial Number: [id number]  Implant date: 8/2/2011    Reason for evaluation:routine PPM Remote  Cardiac device indication(s): sick sinus syndrome    Battery  HUMA: 4.1 years      Interrogation Results  Ventricular sensing: R wave: 11.5 mV  Ventricular capture: not performed  Ventricular lead impedance: right  440 ohms    Parameters  Mode: VVIR  Base Rate: 60    Diagnostic Data  Ventricular paced: 52 %  Mode switch: 0%  AT/AF [place]: 0%  AHR: 0  VHR: 1 on 11/23/2019    Changes made:  No changes     Conclusions: normal device function    Assessment:  1. SSS (sick sinus syndrome) (CMS/HCC)    2. Pacemaker            This document has been electronically signed by MIGUE Curtis on November 26, 2019 3:56 PM

## 2020-01-02 ENCOUNTER — DOCUMENTATION (OUTPATIENT)
Dept: CARDIOLOGY | Facility: CLINIC | Age: 65
End: 2020-01-02

## 2020-01-02 NOTE — PROGRESS NOTES
Talked to patient about his medication.   He said that he still had not got his Dronedarone (multaq) yet. He was just wanting a update on the medication. Told him that Dr. Richard WARREN was at lunch and when she returned that I will let her know and that she will give him a call back when she figures something out.   Patient voiced understanding.

## 2020-01-02 NOTE — PROGRESS NOTES
I called [employer] assistance program to inquire why they have not sent the patient's shipment of Multaq.  They failed to process the reorder request I sent them.  They apologized and said they would get it out today.  Patient has been informed.

## 2020-01-09 ENCOUNTER — DOCUMENTATION (OUTPATIENT)
Dept: CARDIOLOGY | Facility: CLINIC | Age: 65
End: 2020-01-09

## 2020-01-09 NOTE — PROGRESS NOTES
Called patient and informed him his Multaq has arrived.  Patient stated he would be in tomorrow to .

## 2020-05-06 ENCOUNTER — OFFICE VISIT (OUTPATIENT)
Dept: CARDIOLOGY | Facility: CLINIC | Age: 65
End: 2020-05-06

## 2020-05-06 VITALS
WEIGHT: 235 LBS | HEIGHT: 72 IN | DIASTOLIC BLOOD PRESSURE: 89 MMHG | SYSTOLIC BLOOD PRESSURE: 162 MMHG | BODY MASS INDEX: 31.83 KG/M2 | HEART RATE: 60 BPM

## 2020-05-06 DIAGNOSIS — I49.5 SSS (SICK SINUS SYNDROME) (HCC): ICD-10-CM

## 2020-05-06 DIAGNOSIS — I36.1 NON-RHEUMATIC TRICUSPID VALVE INSUFFICIENCY: ICD-10-CM

## 2020-05-06 DIAGNOSIS — I48.0 PAROXYSMAL A-FIB (HCC): Primary | ICD-10-CM

## 2020-05-06 DIAGNOSIS — R55 VASOVAGAL SYNCOPE: ICD-10-CM

## 2020-05-06 PROCEDURE — 99442 PR PHYS/QHP TELEPHONE EVALUATION 11-20 MIN: CPT | Performed by: INTERNAL MEDICINE

## 2020-05-31 ENCOUNTER — APPOINTMENT (OUTPATIENT)
Dept: GENERAL RADIOLOGY | Facility: HOSPITAL | Age: 65
End: 2020-05-31

## 2020-05-31 ENCOUNTER — HOSPITAL ENCOUNTER (INPATIENT)
Facility: HOSPITAL | Age: 65
LOS: 2 days | Discharge: HOME | End: 2020-06-02
Attending: EMERGENCY MEDICINE | Admitting: HOSPITALIST

## 2020-05-31 ENCOUNTER — APPOINTMENT (OUTPATIENT)
Dept: CT IMAGING | Facility: HOSPITAL | Age: 65
End: 2020-05-31

## 2020-05-31 ENCOUNTER — APPOINTMENT (OUTPATIENT)
Dept: ULTRASOUND IMAGING | Facility: HOSPITAL | Age: 65
End: 2020-05-31

## 2020-05-31 DIAGNOSIS — I26.99 BILATERAL PULMONARY EMBOLISM (HCC): Primary | ICD-10-CM

## 2020-05-31 DIAGNOSIS — I82.4Y2 ACUTE DEEP VEIN THROMBOSIS (DVT) OF PROXIMAL VEIN OF LEFT LOWER EXTREMITY (HCC): ICD-10-CM

## 2020-05-31 LAB
ALBUMIN SERPL-MCNC: 3.9 G/DL (ref 3.5–5.2)
ALBUMIN/GLOB SERPL: 1.2 G/DL
ALP SERPL-CCNC: 102 U/L (ref 39–117)
ALT SERPL W P-5'-P-CCNC: 19 U/L (ref 1–41)
ANION GAP SERPL CALCULATED.3IONS-SCNC: 7 MMOL/L (ref 5–15)
APTT PPP: 27 SECONDS (ref 20–40.3)
AST SERPL-CCNC: 28 U/L (ref 1–40)
BASOPHILS # BLD AUTO: 0.04 10*3/MM3 (ref 0–0.2)
BASOPHILS NFR BLD AUTO: 0.8 % (ref 0–1.5)
BILIRUB SERPL-MCNC: 0.4 MG/DL (ref 0.2–1.2)
BILIRUB UR QL STRIP: NEGATIVE
BUN BLD-MCNC: 14 MG/DL (ref 8–23)
BUN/CREAT SERPL: 13.3 (ref 7–25)
CALCIUM SPEC-SCNC: 9.5 MG/DL (ref 8.6–10.5)
CHLORIDE SERPL-SCNC: 104 MMOL/L (ref 98–107)
CLARITY UR: CLEAR
CO2 SERPL-SCNC: 25 MMOL/L (ref 22–29)
COLOR UR: YELLOW
CREAT BLD-MCNC: 1.05 MG/DL (ref 0.76–1.27)
D-DIMER, QUANTITATIVE (MAD,POW, STR): 3483 NG/ML (FEU) (ref 0–470)
DEPRECATED RDW RBC AUTO: 41.5 FL (ref 37–54)
EOSINOPHIL # BLD AUTO: 0.14 10*3/MM3 (ref 0–0.4)
EOSINOPHIL NFR BLD AUTO: 2.8 % (ref 0.3–6.2)
ERYTHROCYTE [DISTWIDTH] IN BLOOD BY AUTOMATED COUNT: 12.9 % (ref 12.3–15.4)
GFR SERPL CREATININE-BSD FRML MDRD: 71 ML/MIN/1.73
GLOBULIN UR ELPH-MCNC: 3.2 GM/DL
GLUCOSE BLD-MCNC: 115 MG/DL (ref 65–99)
GLUCOSE UR STRIP-MCNC: NEGATIVE MG/DL
HCT VFR BLD AUTO: 41.1 % (ref 37.5–51)
HGB BLD-MCNC: 14.3 G/DL (ref 13–17.7)
HGB UR QL STRIP.AUTO: NEGATIVE
HOLD SPECIMEN: NORMAL
IMM GRANULOCYTES # BLD AUTO: 0.01 10*3/MM3 (ref 0–0.05)
IMM GRANULOCYTES NFR BLD AUTO: 0.2 % (ref 0–0.5)
INR PPP: 1.03 (ref 0.8–1.2)
KETONES UR QL STRIP: NEGATIVE
LEUKOCYTE ESTERASE UR QL STRIP.AUTO: NEGATIVE
LYMPHOCYTES # BLD AUTO: 1.82 10*3/MM3 (ref 0.7–3.1)
LYMPHOCYTES NFR BLD AUTO: 35.9 % (ref 19.6–45.3)
MAGNESIUM SERPL-MCNC: 1.9 MG/DL (ref 1.6–2.4)
MCH RBC QN AUTO: 30.8 PG (ref 26.6–33)
MCHC RBC AUTO-ENTMCNC: 34.8 G/DL (ref 31.5–35.7)
MCV RBC AUTO: 88.4 FL (ref 79–97)
MONOCYTES # BLD AUTO: 0.62 10*3/MM3 (ref 0.1–0.9)
MONOCYTES NFR BLD AUTO: 12.2 % (ref 5–12)
NEUTROPHILS # BLD AUTO: 2.44 10*3/MM3 (ref 1.7–7)
NEUTROPHILS NFR BLD AUTO: 48.1 % (ref 42.7–76)
NITRITE UR QL STRIP: NEGATIVE
NRBC BLD AUTO-RTO: 0 /100 WBC (ref 0–0.2)
NT-PROBNP SERPL-MCNC: 105.4 PG/ML (ref 5–900)
PH UR STRIP.AUTO: 6 [PH] (ref 5–9)
PLATELET # BLD AUTO: 179 10*3/MM3 (ref 140–450)
PMV BLD AUTO: 9.9 FL (ref 6–12)
POTASSIUM BLD-SCNC: 4.3 MMOL/L (ref 3.5–5.2)
PROT SERPL-MCNC: 7.1 G/DL (ref 6–8.5)
PROT UR QL STRIP: NEGATIVE
PROTHROMBIN TIME: 13.3 SECONDS (ref 11.1–15.3)
RBC # BLD AUTO: 4.65 10*6/MM3 (ref 4.14–5.8)
SODIUM BLD-SCNC: 136 MMOL/L (ref 136–145)
SP GR UR STRIP: 1.01 (ref 1–1.03)
TROPONIN T SERPL-MCNC: <0.01 NG/ML (ref 0–0.03)
UROBILINOGEN UR QL STRIP: NORMAL
WBC NRBC COR # BLD: 5.07 10*3/MM3 (ref 3.4–10.8)

## 2020-05-31 PROCEDURE — 25010000002 ENOXAPARIN PER 10 MG: Performed by: HOSPITALIST

## 2020-05-31 PROCEDURE — 85379 FIBRIN DEGRADATION QUANT: CPT | Performed by: PHYSICIAN ASSISTANT

## 2020-05-31 PROCEDURE — 80053 COMPREHEN METABOLIC PANEL: CPT | Performed by: PHYSICIAN ASSISTANT

## 2020-05-31 PROCEDURE — 85610 PROTHROMBIN TIME: CPT | Performed by: PHYSICIAN ASSISTANT

## 2020-05-31 PROCEDURE — 93005 ELECTROCARDIOGRAM TRACING: CPT | Performed by: PHYSICIAN ASSISTANT

## 2020-05-31 PROCEDURE — 99284 EMERGENCY DEPT VISIT MOD MDM: CPT

## 2020-05-31 PROCEDURE — 25010000002 ENOXAPARIN PER 10 MG: Performed by: PHYSICIAN ASSISTANT

## 2020-05-31 PROCEDURE — 84484 ASSAY OF TROPONIN QUANT: CPT | Performed by: PHYSICIAN ASSISTANT

## 2020-05-31 PROCEDURE — 83735 ASSAY OF MAGNESIUM: CPT | Performed by: PHYSICIAN ASSISTANT

## 2020-05-31 PROCEDURE — 93005 ELECTROCARDIOGRAM TRACING: CPT | Performed by: EMERGENCY MEDICINE

## 2020-05-31 PROCEDURE — 85025 COMPLETE CBC W/AUTO DIFF WBC: CPT | Performed by: PHYSICIAN ASSISTANT

## 2020-05-31 PROCEDURE — 93971 EXTREMITY STUDY: CPT

## 2020-05-31 PROCEDURE — 0 IOPAMIDOL PER 1 ML: Performed by: EMERGENCY MEDICINE

## 2020-05-31 PROCEDURE — 85730 THROMBOPLASTIN TIME PARTIAL: CPT | Performed by: PHYSICIAN ASSISTANT

## 2020-05-31 PROCEDURE — 93010 ELECTROCARDIOGRAM REPORT: CPT | Performed by: INTERNAL MEDICINE

## 2020-05-31 PROCEDURE — 71275 CT ANGIOGRAPHY CHEST: CPT

## 2020-05-31 PROCEDURE — 81003 URINALYSIS AUTO W/O SCOPE: CPT | Performed by: PHYSICIAN ASSISTANT

## 2020-05-31 PROCEDURE — 83880 ASSAY OF NATRIURETIC PEPTIDE: CPT | Performed by: PHYSICIAN ASSISTANT

## 2020-05-31 PROCEDURE — 71045 X-RAY EXAM CHEST 1 VIEW: CPT

## 2020-05-31 RX ORDER — SODIUM CHLORIDE 0.9 % (FLUSH) 0.9 %
10 SYRINGE (ML) INJECTION EVERY 12 HOURS SCHEDULED
Status: DISCONTINUED | OUTPATIENT
Start: 2020-05-31 | End: 2020-06-02 | Stop reason: HOSPADM

## 2020-05-31 RX ORDER — ASPIRIN 325 MG
325 TABLET ORAL DAILY
Status: DISCONTINUED | OUTPATIENT
Start: 2020-05-31 | End: 2020-06-01

## 2020-05-31 RX ORDER — SODIUM CHLORIDE 0.9 % (FLUSH) 0.9 %
10 SYRINGE (ML) INJECTION AS NEEDED
Status: DISCONTINUED | OUTPATIENT
Start: 2020-05-31 | End: 2020-06-02 | Stop reason: HOSPADM

## 2020-05-31 RX ADMIN — ENOXAPARIN SODIUM 120 MG: 120 INJECTION SUBCUTANEOUS at 18:17

## 2020-05-31 RX ADMIN — ENOXAPARIN SODIUM 120 MG: 120 INJECTION SUBCUTANEOUS at 13:12

## 2020-05-31 RX ADMIN — IOPAMIDOL 61 ML: 755 INJECTION, SOLUTION INTRAVENOUS at 13:02

## 2020-05-31 RX ADMIN — DRONEDARONE 400 MG: 400 TABLET, FILM COATED ORAL at 18:17

## 2020-05-31 RX ADMIN — SODIUM CHLORIDE, PRESERVATIVE FREE 10 ML: 5 INJECTION INTRAVENOUS at 20:27

## 2020-06-01 ENCOUNTER — APPOINTMENT (OUTPATIENT)
Dept: CARDIOLOGY | Facility: HOSPITAL | Age: 65
End: 2020-06-01

## 2020-06-01 PROBLEM — I48.91 ATRIAL FIBRILLATION (HCC): Status: ACTIVE | Noted: 2017-04-14

## 2020-06-01 PROBLEM — I82.402 LEG DVT (DEEP VENOUS THROMBOEMBOLISM), ACUTE, LEFT: Chronic | Status: ACTIVE | Noted: 2020-06-01

## 2020-06-01 PROBLEM — I48.91 ATRIAL FIBRILLATION (HCC): Chronic | Status: ACTIVE | Noted: 2017-04-14

## 2020-06-01 LAB
ANION GAP SERPL CALCULATED.3IONS-SCNC: 8 MMOL/L (ref 5–15)
BASOPHILS # BLD AUTO: 0.05 10*3/MM3 (ref 0–0.2)
BASOPHILS NFR BLD AUTO: 0.9 % (ref 0–1.5)
BH CV ECHO MEAS - ACS: 1.9 CM
BH CV ECHO MEAS - AO MAX PG (FULL): 1.8 MMHG
BH CV ECHO MEAS - AO MAX PG: 6.3 MMHG
BH CV ECHO MEAS - AO MEAN PG (FULL): 1 MMHG
BH CV ECHO MEAS - AO MEAN PG: 3 MMHG
BH CV ECHO MEAS - AO ROOT AREA (BSA CORRECTED): 1.4
BH CV ECHO MEAS - AO ROOT AREA: 8.6 CM^2
BH CV ECHO MEAS - AO ROOT DIAM: 3.3 CM
BH CV ECHO MEAS - AO V2 MAX: 125 CM/SEC
BH CV ECHO MEAS - AO V2 MEAN: 86.1 CM/SEC
BH CV ECHO MEAS - AO V2 VTI: 27.1 CM
BH CV ECHO MEAS - ASC AORTA: 3.1 CM
BH CV ECHO MEAS - AVA(I,A): 3.7 CM^2
BH CV ECHO MEAS - AVA(I,D): 3.7 CM^2
BH CV ECHO MEAS - AVA(V,A): 3.2 CM^2
BH CV ECHO MEAS - AVA(V,D): 3.2 CM^2
BH CV ECHO MEAS - BSA(HAYCOCK): 2.4 M^2
BH CV ECHO MEAS - BSA: 2.3 M^2
BH CV ECHO MEAS - BZI_BMI: 33.2 KILOGRAMS/M^2
BH CV ECHO MEAS - BZI_METRIC_HEIGHT: 182.9 CM
BH CV ECHO MEAS - BZI_METRIC_WEIGHT: 111.1 KG
BH CV ECHO MEAS - EDV(CUBED): 73.6 ML
BH CV ECHO MEAS - EDV(MOD-SP2): 25.8 ML
BH CV ECHO MEAS - EDV(MOD-SP4): 36.6 ML
BH CV ECHO MEAS - EDV(TEICH): 78.1 ML
BH CV ECHO MEAS - EF(CUBED): 69.5 %
BH CV ECHO MEAS - EF(MOD-SP2): 50.4 %
BH CV ECHO MEAS - EF(MOD-SP4): 56.3 %
BH CV ECHO MEAS - EF(TEICH): 61.5 %
BH CV ECHO MEAS - ESV(CUBED): 22.4 ML
BH CV ECHO MEAS - ESV(MOD-SP2): 12.8 ML
BH CV ECHO MEAS - ESV(MOD-SP4): 16 ML
BH CV ECHO MEAS - ESV(TEICH): 30.1 ML
BH CV ECHO MEAS - FS: 32.7 %
BH CV ECHO MEAS - IVS/LVPW: 1.3
BH CV ECHO MEAS - IVSD: 1.3 CM
BH CV ECHO MEAS - LA DIMENSION: 4 CM
BH CV ECHO MEAS - LA/AO: 1.2
BH CV ECHO MEAS - LV DIASTOLIC VOL/BSA (35-75): 15.8 ML/M^2
BH CV ECHO MEAS - LV MASS(C)D: 168.9 GRAMS
BH CV ECHO MEAS - LV MASS(C)DI: 72.7 GRAMS/M^2
BH CV ECHO MEAS - LV MAX PG: 4.5 MMHG
BH CV ECHO MEAS - LV MEAN PG: 2 MMHG
BH CV ECHO MEAS - LV SYSTOLIC VOL/BSA (12-30): 6.9 ML/M^2
BH CV ECHO MEAS - LV V1 MAX: 106 CM/SEC
BH CV ECHO MEAS - LV V1 MEAN: 65.2 CM/SEC
BH CV ECHO MEAS - LV V1 VTI: 26.1 CM
BH CV ECHO MEAS - LVIDD: 4.2 CM
BH CV ECHO MEAS - LVIDS: 2.8 CM
BH CV ECHO MEAS - LVLD AP2: 7.5 CM
BH CV ECHO MEAS - LVLD AP4: 7.1 CM
BH CV ECHO MEAS - LVLS AP2: 6.4 CM
BH CV ECHO MEAS - LVLS AP4: 6.2 CM
BH CV ECHO MEAS - LVOT AREA (M): 3.8 CM^2
BH CV ECHO MEAS - LVOT AREA: 3.8 CM^2
BH CV ECHO MEAS - LVOT DIAM: 2.2 CM
BH CV ECHO MEAS - LVPWD: 1 CM
BH CV ECHO MEAS - MV A MAX VEL: 75.4 CM/SEC
BH CV ECHO MEAS - MV DEC SLOPE: 266 CM/SEC^2
BH CV ECHO MEAS - MV E MAX VEL: 60.3 CM/SEC
BH CV ECHO MEAS - MV E/A: 0.8
BH CV ECHO MEAS - MV P1/2T MAX VEL: 60.8 CM/SEC
BH CV ECHO MEAS - MV P1/2T: 66.9 MSEC
BH CV ECHO MEAS - MVA P1/2T LCG: 3.6 CM^2
BH CV ECHO MEAS - MVA(P1/2T): 3.3 CM^2
BH CV ECHO MEAS - PA MAX PG (FULL): 3.8 MMHG
BH CV ECHO MEAS - PA MAX PG: 5.6 MMHG
BH CV ECHO MEAS - PA V2 MAX: 118 CM/SEC
BH CV ECHO MEAS - RAP SYSTOLE: 5 MMHG
BH CV ECHO MEAS - RV MAX PG: 1.8 MMHG
BH CV ECHO MEAS - RV MEAN PG: 1 MMHG
BH CV ECHO MEAS - RV V1 MAX: 66.6 CM/SEC
BH CV ECHO MEAS - RV V1 MEAN: 46.9 CM/SEC
BH CV ECHO MEAS - RV V1 VTI: 15.6 CM
BH CV ECHO MEAS - RVDD: 2.9 CM
BH CV ECHO MEAS - RVSP: 32.7 MMHG
BH CV ECHO MEAS - SI(AO): 99.8 ML/M^2
BH CV ECHO MEAS - SI(CUBED): 22 ML/M^2
BH CV ECHO MEAS - SI(LVOT): 42.7 ML/M^2
BH CV ECHO MEAS - SI(MOD-SP2): 5.6 ML/M^2
BH CV ECHO MEAS - SI(MOD-SP4): 8.9 ML/M^2
BH CV ECHO MEAS - SI(TEICH): 20.7 ML/M^2
BH CV ECHO MEAS - SV(AO): 231.8 ML
BH CV ECHO MEAS - SV(CUBED): 51.1 ML
BH CV ECHO MEAS - SV(LVOT): 99.2 ML
BH CV ECHO MEAS - SV(MOD-SP2): 13 ML
BH CV ECHO MEAS - SV(MOD-SP4): 20.6 ML
BH CV ECHO MEAS - SV(TEICH): 48.1 ML
BH CV ECHO MEAS - TR MAX VEL: 263 CM/SEC
BUN BLD-MCNC: 11 MG/DL (ref 8–23)
BUN/CREAT SERPL: 11.1 (ref 7–25)
CALCIUM SPEC-SCNC: 9.1 MG/DL (ref 8.6–10.5)
CHLORIDE SERPL-SCNC: 102 MMOL/L (ref 98–107)
CO2 SERPL-SCNC: 25 MMOL/L (ref 22–29)
CREAT BLD-MCNC: 0.99 MG/DL (ref 0.76–1.27)
DEPRECATED RDW RBC AUTO: 41.7 FL (ref 37–54)
EOSINOPHIL # BLD AUTO: 0.16 10*3/MM3 (ref 0–0.4)
EOSINOPHIL NFR BLD AUTO: 2.9 % (ref 0.3–6.2)
ERYTHROCYTE [DISTWIDTH] IN BLOOD BY AUTOMATED COUNT: 12.8 % (ref 12.3–15.4)
GFR SERPL CREATININE-BSD FRML MDRD: 76 ML/MIN/1.73
GLUCOSE BLD-MCNC: 108 MG/DL (ref 65–99)
HCT VFR BLD AUTO: 43.5 % (ref 37.5–51)
HCYS SERPL-MCNC: 10.9 UMOL/L (ref 0–15)
HGB BLD-MCNC: 14.8 G/DL (ref 13–17.7)
IMM GRANULOCYTES # BLD AUTO: 0.01 10*3/MM3 (ref 0–0.05)
IMM GRANULOCYTES NFR BLD AUTO: 0.2 % (ref 0–0.5)
LV EF 2D ECHO EST: 50 %
LYMPHOCYTES # BLD AUTO: 1.96 10*3/MM3 (ref 0.7–3.1)
LYMPHOCYTES NFR BLD AUTO: 35.9 % (ref 19.6–45.3)
MCH RBC QN AUTO: 30.4 PG (ref 26.6–33)
MCHC RBC AUTO-ENTMCNC: 34 G/DL (ref 31.5–35.7)
MCV RBC AUTO: 89.3 FL (ref 79–97)
MONOCYTES # BLD AUTO: 0.66 10*3/MM3 (ref 0.1–0.9)
MONOCYTES NFR BLD AUTO: 12.1 % (ref 5–12)
NEUTROPHILS # BLD AUTO: 2.62 10*3/MM3 (ref 1.7–7)
NEUTROPHILS NFR BLD AUTO: 48 % (ref 42.7–76)
NRBC BLD AUTO-RTO: 0 /100 WBC (ref 0–0.2)
PLATELET # BLD AUTO: 201 10*3/MM3 (ref 140–450)
PMV BLD AUTO: 9.9 FL (ref 6–12)
POTASSIUM BLD-SCNC: 4.5 MMOL/L (ref 3.5–5.2)
RBC # BLD AUTO: 4.87 10*6/MM3 (ref 4.14–5.8)
SODIUM BLD-SCNC: 135 MMOL/L (ref 136–145)
WBC NRBC COR # BLD: 5.46 10*3/MM3 (ref 3.4–10.8)

## 2020-06-01 PROCEDURE — 93306 TTE W/DOPPLER COMPLETE: CPT | Performed by: INTERNAL MEDICINE

## 2020-06-01 PROCEDURE — 93306 TTE W/DOPPLER COMPLETE: CPT

## 2020-06-01 PROCEDURE — 81240 F2 GENE: CPT | Performed by: NURSE PRACTITIONER

## 2020-06-01 PROCEDURE — 25010000002 ENOXAPARIN PER 10 MG: Performed by: HOSPITALIST

## 2020-06-01 PROCEDURE — 81241 F5 GENE: CPT | Performed by: NURSE PRACTITIONER

## 2020-06-01 PROCEDURE — 85025 COMPLETE CBC W/AUTO DIFF WBC: CPT | Performed by: HOSPITALIST

## 2020-06-01 PROCEDURE — 80048 BASIC METABOLIC PNL TOTAL CA: CPT | Performed by: HOSPITALIST

## 2020-06-01 PROCEDURE — 83090 ASSAY OF HOMOCYSTEINE: CPT | Performed by: NURSE PRACTITIONER

## 2020-06-01 PROCEDURE — 99223 1ST HOSP IP/OBS HIGH 75: CPT | Performed by: NURSE PRACTITIONER

## 2020-06-01 RX ORDER — ASPIRIN 81 MG/1
81 TABLET, CHEWABLE ORAL DAILY
Status: DISCONTINUED | OUTPATIENT
Start: 2020-06-02 | End: 2020-06-02 | Stop reason: HOSPADM

## 2020-06-01 RX ADMIN — SODIUM CHLORIDE, PRESERVATIVE FREE 10 ML: 5 INJECTION INTRAVENOUS at 08:56

## 2020-06-01 RX ADMIN — APIXABAN 10 MG: 5 TABLET, FILM COATED ORAL at 10:46

## 2020-06-01 RX ADMIN — ENOXAPARIN SODIUM 120 MG: 120 INJECTION SUBCUTANEOUS at 06:01

## 2020-06-01 RX ADMIN — ASPIRIN 325 MG: 325 TABLET ORAL at 08:56

## 2020-06-01 RX ADMIN — DRONEDARONE 400 MG: 400 TABLET, FILM COATED ORAL at 08:56

## 2020-06-01 RX ADMIN — APIXABAN 10 MG: 5 TABLET, FILM COATED ORAL at 20:41

## 2020-06-01 RX ADMIN — DRONEDARONE 400 MG: 400 TABLET, FILM COATED ORAL at 16:57

## 2020-06-01 RX ADMIN — SODIUM CHLORIDE, PRESERVATIVE FREE 10 ML: 5 INJECTION INTRAVENOUS at 20:41

## 2020-06-01 NOTE — PLAN OF CARE
Problem: Patient Care Overview  Goal: Plan of Care Review  Outcome: Ongoing (interventions implemented as appropriate)  Flowsheets (Taken 6/1/2020 2601)  Progress: no change  Plan of Care Reviewed With: patient  Goal: Individualization and Mutuality  Outcome: Ongoing (interventions implemented as appropriate)  Goal: Discharge Needs Assessment  Outcome: Ongoing (interventions implemented as appropriate)  Goal: Interprofessional Rounds/Family Conf  Outcome: Ongoing (interventions implemented as appropriate)     Problem: VTE, DVT and PE (Adult)  Goal: Signs and Symptoms of Listed Potential Problems Will be Absent, Minimized or Managed (VTE, DVT and PE)  Outcome: Ongoing (interventions implemented as appropriate)

## 2020-06-01 NOTE — PLAN OF CARE
Problem: Patient Care Overview  Goal: Plan of Care Review  Outcome: Ongoing (interventions implemented as appropriate)  Flowsheets (Taken 6/1/2020 0990)  Progress: no change  Plan of Care Reviewed With: patient  Outcome Summary: started on eliquis today, dc tomorrow  Goal: Individualization and Mutuality  Outcome: Ongoing (interventions implemented as appropriate)  Goal: Discharge Needs Assessment  Outcome: Ongoing (interventions implemented as appropriate)  Goal: Interprofessional Rounds/Family Conf  Outcome: Ongoing (interventions implemented as appropriate)     Problem: VTE, DVT and PE (Adult)  Goal: Signs and Symptoms of Listed Potential Problems Will be Absent, Minimized or Managed (VTE, DVT and PE)  Outcome: Ongoing (interventions implemented as appropriate)

## 2020-06-01 NOTE — PROGRESS NOTES
Progress Note  Rudi Palafox MD  Hospitalist    Date of visit: 6/1/2020     LOS: 1 day   Patient Care Team:  Provider, No Known as PCP - General    Chief Complaint: L calf swelling    Subjective     Interval History:     Patient Complaints: L calf swelling / improved    History taken from: patient    Medication Review:   Current Facility-Administered Medications   Medication Dose Route Frequency Provider Last Rate Last Dose   • apixaban (ELIQUIS) tablet 10 mg  10 mg Oral Q12H Buzz Senior APRN   10 mg at 06/01/20 1046    Followed by   • [START ON 6/8/2020] apixaban (ELIQUIS) tablet 5 mg  5 mg Oral Q12H Buzz Senior APRN       • [START ON 6/2/2020] aspirin chewable tablet 81 mg  81 mg Oral Daily Buzz Senior APRN       • dronedarone (MULTAQ) tablet 400 mg  400 mg Oral BID With Meals Bassam Winchester MD   400 mg at 06/01/20 0856   • sodium chloride 0.9 % flush 10 mL  10 mL Intravenous PRN Bassam Winchester MD       • sodium chloride 0.9 % flush 10 mL  10 mL Intravenous Q12H Bassam Winchester MD   10 mL at 06/01/20 0856   • sodium chloride 0.9 % flush 10 mL  10 mL Intravenous PRN Bassam Winchester MD           Review of Systems:   Review of Systems   Constitutional: Positive for fatigue. Negative for fever.   Respiratory: Positive for cough. Negative for shortness of breath and wheezing.    Cardiovascular: Positive for leg swelling. Negative for chest pain and palpitations.   Gastrointestinal: Negative for abdominal distention, abdominal pain, anal bleeding, constipation, diarrhea, nausea and vomiting.   Genitourinary: Negative for dysuria, frequency, hematuria, penile pain, scrotal swelling and urgency.   Musculoskeletal: Positive for arthralgias. Negative for back pain and gait problem.   Skin: Negative for color change and pallor.   Neurological: Positive for weakness. Negative for tremors, speech difficulty, light-headedness and headaches.   Psychiatric/Behavioral: Negative for agitation,  behavioral problems and confusion.   All other systems reviewed and are negative.      Objective     Vital Signs  Temp:  [96.4 °F (35.8 °C)-98 °F (36.7 °C)] 97.6 °F (36.4 °C)  Heart Rate:  [60-76] 63  Resp:  [16-20] 16  BP: (106-152)/(63-91) 106/63    Physical Exam:  Physical Exam   Constitutional: He is oriented to person, place, and time. He appears well-developed and well-nourished. No distress.   HENT:   Head: Normocephalic and atraumatic.   Eyes: Pupils are equal, round, and reactive to light. EOM are normal. No scleral icterus.   Neck: Normal range of motion. Neck supple.   Cardiovascular: Normal rate and regular rhythm.   Pulmonary/Chest: Effort normal and breath sounds normal. No stridor. No respiratory distress. He has no wheezes.   Abdominal: Soft. Bowel sounds are normal. He exhibits no distension. There is no tenderness. There is no guarding.   Musculoskeletal: Normal range of motion. He exhibits edema (L calf). He exhibits no tenderness.   Neurological: He is alert and oriented to person, place, and time. He displays normal reflexes. No cranial nerve deficit. Coordination normal.   Skin: Skin is warm and dry. No rash noted. No erythema. No pallor.   Psychiatric: He has a normal mood and affect. His behavior is normal.   Vitals reviewed.       Results Review:    Lab Results (last 24 hours)     Procedure Component Value Units Date/Time    Homocysteine [[id number]] Collected:  06/01/20 1105    Specimen:  Blood Updated:  06/01/20 1108    Factor II, DNA Analysis [[id number]] Collected:  06/01/20 1105    Specimen:  Blood Updated:  06/01/20 1108    Factor 5 Leiden [[id number]] Collected:  06/01/20 1105    Specimen:  Blood Updated:  06/01/20 1108    Basic Metabolic Panel [[id number]]  (Abnormal) Collected:  06/01/20 0611    Specimen:  Blood Updated:  06/01/20 0700     Glucose 108 mg/dL      BUN 11 mg/dL      Creatinine 0.99 mg/dL      Sodium 135 mmol/L      Potassium 4.5 mmol/L      Chloride 102 mmol/L      CO2  25.0 mmol/L      Calcium 9.1 mg/dL      eGFR Non African Amer 76 mL/min/1.73      BUN/Creatinine Ratio 11.1     Anion Gap 8.0 mmol/L     Narrative:       GFR Normal >60  Chronic Kidney Disease <60  Kidney Failure <15      CBC & Differential [[id number]] Collected:  06/01/20 0611    Specimen:  Blood Updated:  06/01/20 0620    Narrative:       The following orders were created for panel order CBC & Differential.  Procedure                               Abnormality         Status                     ---------                               -----------         ------                     CBC Auto Differential[[id number]]        Abnormal            Final result                 Please view results for these tests on the individual orders.    CBC Auto Differential [[id number]]  (Abnormal) Collected:  06/01/20 0611    Specimen:  Blood Updated:  06/01/20 0620     WBC 5.46 10*3/mm3      RBC 4.87 10*6/mm3      Hemoglobin 14.8 g/dL      Hematocrit 43.5 %      MCV 89.3 fL      MCH 30.4 pg      MCHC 34.0 g/dL      RDW 12.8 %      RDW-SD 41.7 fl      MPV 9.9 fL      Platelets 201 10*3/mm3      Neutrophil % 48.0 %      Lymphocyte % 35.9 %      Monocyte % 12.1 %      Eosinophil % 2.9 %      Basophil % 0.9 %      Immature Grans % 0.2 %      Neutrophils, Absolute 2.62 10*3/mm3      Lymphocytes, Absolute 1.96 10*3/mm3      Monocytes, Absolute 0.66 10*3/mm3      Eosinophils, Absolute 0.16 10*3/mm3      Basophils, Absolute 0.05 10*3/mm3      Immature Grans, Absolute 0.01 10*3/mm3      nRBC 0.0 /100 WBC     Extra Tubes [[id number]] Collected:  05/31/20 1209    Specimen:  Blood, Venous Line Updated:  05/31/20 1315    Narrative:       The following orders were created for panel order Extra Tubes.  Procedure                               Abnormality         Status                     ---------                               -----------         ------                     [facility] - [facility][[id number]]                                   Final result                  Please view results for these tests on the individual orders.    Gold Top - [facility] [[id number]] Collected:  05/31/20 1209    Specimen:  Blood Updated:  05/31/20 1315     Extra Tube Hold for add-ons.     Comment: Auto resulted.       Comprehensive Metabolic Panel [[id number]]  (Abnormal) Collected:  05/31/20 1209    Specimen:  Blood Updated:  05/31/20 1240     Glucose 115 mg/dL      BUN 14 mg/dL      Creatinine 1.05 mg/dL      Sodium 136 mmol/L      Potassium 4.3 mmol/L      Chloride 104 mmol/L      CO2 25.0 mmol/L      Calcium 9.5 mg/dL      Total Protein 7.1 g/dL      Albumin 3.90 g/dL      ALT (SGPT) 19 U/L      AST (SGOT) 28 U/L      Alkaline Phosphatase 102 U/L      Total Bilirubin 0.4 mg/dL      eGFR Non African Amer 71 mL/min/1.73      Globulin 3.2 gm/dL      A/G Ratio 1.2 g/dL      BUN/Creatinine Ratio 13.3     Anion Gap 7.0 mmol/L     Narrative:       GFR Normal >60  Chronic Kidney Disease <60  Kidney Failure <15      Magnesium [[id number]]  (Normal) Collected:  05/31/20 1209    Specimen:  Blood Updated:  05/31/20 1240     Magnesium 1.9 mg/dL     Troponin [[id number]]  (Normal) Collected:  05/31/20 1209    Specimen:  Blood Updated:  05/31/20 1240     Troponin T <0.010 ng/mL     Narrative:       Troponin T Reference Range:  <= 0.03 ng/mL-   Negative for AMI  >0.03 ng/mL-     Abnormal for myocardial necrosis.  Clinicians would have to utilize clinical acumen, EKG, Troponin and serial changes to determine if it is an Acute Myocardial Infarction or myocardial injury due to an underlying chronic condition.       Results may be falsely decreased if patient taking Biotin.      BNP [[id number]]  (Normal) Collected:  05/31/20 1209    Specimen:  Blood Updated:  05/31/20 1238     proBNP 105.4 pg/mL     Narrative:       Among patients with dyspnea, NT-proBNP is highly sensitive for the detection of acute congestive heart failure. In addition NT-proBNP of <300 pg/ml effectively rules out acute congestive heart failure  with 99% negative predictive value.    Results may be falsely decreased if patient taking Biotin.      Protime-INR [[id number]]  (Normal) Collected:  05/31/20 1209    Specimen:  Blood Updated:  05/31/20 1235     Protime 13.3 Seconds      INR 1.03    Narrative:       Therapeutic range for most indications is 2.0-3.0 INR,  or 2.5-3.5 for mechanical heart valves.    aPTT [[id number]]  (Normal) Collected:  05/31/20 1209    Specimen:  Blood Updated:  05/31/20 1235     PTT 27.0 seconds     Narrative:       The recommended Heparin therapeutic range is 68-97 seconds.    D-dimer, Quantitative [[id number]]  (Abnormal) Collected:  05/31/20 1209    Specimen:  Blood Updated:  05/31/20 1235     D-Dimer, Quantitative 3,483 ng/mL (FEU)     Narrative:       Dimer values <500 ng/ml FEU are FDA approved as aid in diagnosis of deep venous thrombosis and pulmonary embolism.  This test should not be used in an exclusion strategy with pretest probability alone.    A recent guideline regarding diagnosis for pulmonary thromboembolism recommends an adjusted exclusion criterion of age x 10 ng/ml FEU for patients >50 years of age ([employer] Med 2015; 163: 701-711).      CBC & Differential [[id number]] Collected:  05/31/20 1209    Specimen:  Blood Updated:  05/31/20 1216    Narrative:       The following orders were created for panel order CBC & Differential.  Procedure                               Abnormality         Status                     ---------                               -----------         ------                     CBC Auto Differential[[id number]]        Abnormal            Final result                 Please view results for these tests on the individual orders.    CBC Auto Differential [[id number]]  (Abnormal) Collected:  05/31/20 1209    Specimen:  Blood Updated:  05/31/20 1216     WBC 5.07 10*3/mm3      RBC 4.65 10*6/mm3      Hemoglobin 14.3 g/dL      Hematocrit 41.1 %      MCV 88.4 fL      MCH 30.8 pg      MCHC 34.8 g/dL      RDW  12.9 %      RDW-SD 41.5 fl      MPV 9.9 fL      Platelets 179 10*3/mm3      Neutrophil % 48.1 %      Lymphocyte % 35.9 %      Monocyte % 12.2 %      Eosinophil % 2.8 %      Basophil % 0.8 %      Immature Grans % 0.2 %      Neutrophils, Absolute 2.44 10*3/mm3      Lymphocytes, Absolute 1.82 10*3/mm3      Monocytes, Absolute 0.62 10*3/mm3      Eosinophils, Absolute 0.14 10*3/mm3      Basophils, Absolute 0.04 10*3/mm3      Immature Grans, Absolute 0.01 10*3/mm3      nRBC 0.0 /100 WBC     Urinalysis With Microscopic If Indicated (No Culture) - Urine, Clean Catch [[id number]]  (Normal) Collected:  05/31/20 1145    Specimen:  Urine, Clean Catch Updated:  05/31/20 1153     Color, UA Yellow     Appearance, UA Clear     pH, UA 6.0     Specific Gravity, UA 1.010     Glucose, UA Negative     Ketones, UA Negative     Bilirubin, UA Negative     Blood, UA Negative     Protein, UA Negative     Leuk Esterase, UA Negative     Nitrite, UA Negative     Urobilinogen, UA 0.2 E.U./dL    Narrative:       Urine microscopic not indicated.          Imaging Results (Last 24 Hours)     Procedure Component Value Units Date/Time    CT Angiogram Chest [[id number]] Collected:  05/31/20 1300     Updated:  05/31/20 1347    Narrative:       PROCEDURE: CT ANGIOGRAM CHEST    HISTORY: sob. dvt. Pe suspected    Indication: Same as above    Comparison: 2/28/2017    Technique:     CT of the chest was done with intravenous contrast followed by CT  angiography of the pulmonary arteries.    Coronal, Sagittal and 3D volumetric MIP reconstructions were  generated from the acquired data.    The patient was injected with 61 mL of Isovue-370 radiographic  contrast intravenously, without any documented immediate adverse  reactions.     This exam was performed according to our departmental  dose-optimization program, which includes automated exposure  control, adjustment of the mA and/or KV according to the  patient's size and/or use of iterative reconstruction  technique.    FINDINGS:     There is presence of filling defects in the bilateral main  pulmonary arteries, extending peripherally into the main  bilateral segmental and subsegmental branches of the pulmonary  arteries, suggestive of significant bilateral pulmonary embolism.    Note is again made of pathologically enlarged lymph nodes in the  AP window,, right hilum, right paratracheal region and the  precarinal region, the largest of these lymph nodes seen in the  right paratracheal region measuring 15 mm in short axis  dimension.    There are no discrete airspace infiltrates, pneumothoraces or  pleural effusions. Note is again made of mild bilateral  gynecomastia.    The trachea, bilateral mainstem bronchi and the bilateral main   segmental bronchi are patent without any intraluminal mass  lesions.    There is no gross evidence of clinically significant thoracic  aortic aneurysm or thoracic aortic dissection.     There is no clinically significant pericardial effusion.    There are no pathologically enlarged lymph nodes in the  mediastinum, bilateral hilar, bilateral supraclavicular or the  bilateral axillary regions.    The thoracic bony rib cage appears grossly unremarkable.    The visualized thoracic spine show multilevel degenerative  change.    Note is made of few subcentimeter nonobstructive calculi in the  partially visualized bilateral kidneys.    The findings were discussed with Miss Kiley García PA-C, at 1:43  PM      Impression:         There is presence of filling defects in the bilateral main  pulmonary arteries, extending peripherally into the main  bilateral segmental and subsegmental branches of the pulmonary  arteries, suggestive of significant bilateral pulmonary embolism.    Note is again made of pathologically enlarged lymph nodes in the  AP window,, right hilum, right paratracheal region and the  precarinal region, the largest of these lymph nodes seen in the  right paratracheal region  measuring 15 mm in short axis  dimension.    Note is made of few subcentimeter nonobstructive calculi in the  partially visualized bilateral kidneys.    Electronically signed by:  Lance Rankin MD  5/31/2020 1:46 PM CDT  Workstation: [id number]    US Venous Doppler Lower Extremity Left (duplex) [[id number]] Collected:  05/31/20 1139     Updated:  05/31/20 1221    Narrative:       Doppler duplex venous examination left lower extremity.       CLINICAL INDICATION: Swelling.          COMPARISON: None    FINDINGS:    The common femoral,  femoral, and popliteal veins of the left      lower extremity are well identified. Echogenic noncompressible  thrombi left distal superficial femoral vein and posterior calf  veins.      Impression:       Echogenic noncompressible thrombi distal left  superficial femoral vein above the knee and posterior calf veins.        These findings discussed with Dr. Ronen Ramirez in the emergency  department at 12:19 PM.    Electronically signed by:  Nadeem Holm MD  5/31/2020 12:20 PM CDT  Workstation: [id number]    XR Chest 1 View [[id number]] Collected:  05/31/20 1137     Updated:  05/31/20 1208    Narrative:       PROCEDURE: XR CHEST 1 VIEW    Clinical History: shortness of breath    Indication:     Same as above.    Comparison:     None available at present.    Technique:     Single portable frontal projection of the chest was done.    Findings:    Underlying changes of COPD are noted. There is good position of  the dual chamber left-sided pacemaker wires.    There are no discrete airspace infiltrates, pneumothoraces or  pleural effusions.    The pulmonary vascularity is normal.    The cardiomediastinal contour is  unremarkable.      Impression:       Impression:     There is no acute pleural-parenchymal process seen in the imaged  lung fields.    Electronically signed by:  Lance Rankin MD  5/31/2020 12:07 PM  CDT Workstation: [id number]          Assessment/Plan       Bilateral pulmonary  embolism (CMS/HCC)    Leg DVT (deep venous thromboembolism), acute, left (CMS/HCC)    Atrial fibrillation (CMS/HCC)    Continue with leg elevation, full anticoagulation with Eliquis. Follow up on the Echo, factor 5 Leiden. The ECG shows a regular rhythm but the patient wears a pacemaker.    Rudi Palafox MD  06/01/20  11:10

## 2020-06-01 NOTE — CONSULTS
CVTS CONSULTATION          Patient Care Team:  Provider, No Known as PCP - General     Requesting Provider: Dr. Winchester requesting Dr. Weathers    Chief complaint: Shortness of breath      SUBJECTIVE       History of Present Illness:  64 y.o. male with HTN(stable, increased risk stroke, rupture), Hyperlipidemia(stable, increased risk cardiovascular events), Obesity(uncontrolled, increased risk cardiovascular events) and Atrial fibrillation(stable, increased risk stroke)  SSS status post pacemaker, bilateral venous insuffiency (vein work in [place] both sides. never smoked.  FH of DVT/PE no confirmed genetic cause at this time.  Patient presented to [facility] on 5/31 with complaints of shortness of breath x4 days gradually worsening.  LLE swelling has been present for several weeks preceding admission.  Lower extremity duplex was positive for DVT in left SFV and distal posterior calf vessels.  CTA was obtained demonstrating bilateral lower lobe PE and CTVS Dr. Weathers was consulted for further recommendations.   No other associated symptoms or modifying factors.    2015 Left GSV ablation Dr. Elliott, followed in [place] [facility]    10/2018 Echo: EF 60%, grade I DD, mild TR, RVDD 32mm  5/31/2020 Left LE Duplex: Acute DVT L SFV, Posterior calf vessels  5/31/2020 CTA Chest: Bilateral lower lobe pulmonary embolus. Renal calculi.     6/1/2020 Echo: Pending at time of consultation, preliminary RVSP 32    The following portions of the patient's history were reviewed and updated as appropriate: allergies, current medications, past family history, past medical history, past social history, past surgical history and problem list.  Recent images independently reviewed.  Available laboratory values reviewed.    Review of Systems   Constitutional: Positive for fatigue. Negative for appetite change and fever.   HENT: Negative for congestion, tinnitus and trouble swallowing.    Eyes: Negative for visual disturbance.    Respiratory: Positive for chest tightness and shortness of breath.    Cardiovascular: Positive for leg swelling. Negative for chest pain.   Gastrointestinal: Negative for abdominal pain, nausea and vomiting.   Endocrine: Negative for polyuria.   Genitourinary: Negative for difficulty urinating, flank pain and hematuria.   Musculoskeletal: Positive for arthralgias and joint swelling.   Skin: Negative for color change and wound.   Allergic/Immunologic: Negative for immunocompromised state.   Neurological: Negative for dizziness, seizures, light-headedness and numbness.   Hematological: Bruises/bleeds easily.        Does not report S/S of adverse bleeding event including nose bleeds, hematuria, melena, gingival bleeding, or hematemesis.   Psychiatric/Behavioral: Negative for agitation and confusion.        Past Medical History:   Diagnosis Date   • Arrhythmia    • Atrial fibrillation (CMS/HCC)    • SSS (sick sinus syndrome) (CMS/HCC)      Past Surgical History:   Procedure Laterality Date   • GALLBLADDER SURGERY     • INSERT / REPLACE / REMOVE PACEMAKER     • SKIN LESION EXCISION     • VASECTOMY     • VEIN SURGERY       Family History   Problem Relation Age of Onset   • No Known Problems Mother    • No Known Problems Father      Social History     Tobacco Use   • Smoking status: Never Smoker   • Smokeless tobacco: Current User     Types: Snuff   Substance Use Topics   • Alcohol use: No   • Drug use: No     Medications Prior to Admission   Medication Sig Dispense Refill Last Dose   • aspirin 325 MG tablet Take 325 mg by mouth Daily.   Taking   • dronedarone (MULTAQ) 400 MG tablet Take 1 tablet by mouth 2 (Two) Times a Day With Meals. 180 tablet 3 Taking   • Glucosamine-Chondroitin 250-200 MG tablet Take  by mouth.      • Multiple Vitamins-Minerals (MULTIVITAL PO) Take 1 tablet by mouth Daily.   Not Taking       aspirin 325 mg Oral Daily   dronedarone 400 mg Oral BID With Meals   enoxaparin 1 mg/kg Subcutaneous Q12H  "  sodium chloride 10 mL Intravenous Q12H     Allergies:  Patient has no known allergies.    OBJECTIVE        Vital Signs  Temp:  [96.4 °F (35.8 °C)-98 °F (36.7 °C)] 97.6 °F (36.4 °C)  Heart Rate:  [60-76] 63  Resp:  [16-20] 16  BP: (106-152)/(63-91) 106/63    Flowsheet Rows      First Filed Value   Admission Height  182.9 cm (72\") Documented at 05/31/2020 1210   Admission Weight  115 kg (253 lb 3.2 oz) Documented at 05/31/2020 1210        182.9 cm (72\")    Physical Exam   Constitutional: He is oriented to person, place, and time. He appears well-developed and well-nourished.   HENT:   Head: Normocephalic and atraumatic.   Mouth/Throat: Oropharynx is clear and moist.   Eyes: Pupils are equal, round, and reactive to light. Conjunctivae and EOM are normal.   Neck: Normal range of motion. Neck supple.   Cardiovascular: Normal rate and intact distal pulses.   No murmur heard.  Pulmonary/Chest: Effort normal and breath sounds normal. No respiratory distress.   Abdominal: Soft. Bowel sounds are normal. He exhibits no distension.   Musculoskeletal: Normal range of motion. He exhibits edema (LLE +2). He exhibits no tenderness.   Neurological: He is alert and oriented to person, place, and time.   Skin: Skin is warm and dry. Capillary refill takes 2 to 3 seconds.   Psychiatric: He has a normal mood and affect. Judgment normal.   Nursing note and vitals reviewed.      Results Review:   Lab Results (last 24 hours)     Procedure Component Value Units Date/Time    Basic Metabolic Panel [[id number]]  (Abnormal) Collected:  06/01/20 0611    Specimen:  Blood Updated:  06/01/20 0700     Glucose 108 mg/dL      BUN 11 mg/dL      Creatinine 0.99 mg/dL      Sodium 135 mmol/L      Potassium 4.5 mmol/L      Chloride 102 mmol/L      CO2 25.0 mmol/L      Calcium 9.1 mg/dL      eGFR Non African Amer 76 mL/min/1.73      BUN/Creatinine Ratio 11.1     Anion Gap 8.0 mmol/L     Narrative:       GFR Normal >60  Chronic Kidney Disease <60  Kidney " Failure <15      CBC & Differential [[id number]] Collected:  06/01/20 0611    Specimen:  Blood Updated:  06/01/20 0620    Narrative:       The following orders were created for panel order CBC & Differential.  Procedure                               Abnormality         Status                     ---------                               -----------         ------                     CBC Auto Differential[[id number]]        Abnormal            Final result                 Please view results for these tests on the individual orders.    CBC Auto Differential [[id number]]  (Abnormal) Collected:  06/01/20 0611    Specimen:  Blood Updated:  06/01/20 0620     WBC 5.46 10*3/mm3      RBC 4.87 10*6/mm3      Hemoglobin 14.8 g/dL      Hematocrit 43.5 %      MCV 89.3 fL      MCH 30.4 pg      MCHC 34.0 g/dL      RDW 12.8 %      RDW-SD 41.7 fl      MPV 9.9 fL      Platelets 201 10*3/mm3      Neutrophil % 48.0 %      Lymphocyte % 35.9 %      Monocyte % 12.1 %      Eosinophil % 2.9 %      Basophil % 0.9 %      Immature Grans % 0.2 %      Neutrophils, Absolute 2.62 10*3/mm3      Lymphocytes, Absolute 1.96 10*3/mm3      Monocytes, Absolute 0.66 10*3/mm3      Eosinophils, Absolute 0.16 10*3/mm3      Basophils, Absolute 0.05 10*3/mm3      Immature Grans, Absolute 0.01 10*3/mm3      nRBC 0.0 /100 WBC     Extra Tubes [[id number]] Collected:  05/31/20 1209    Specimen:  Blood, Venous Line Updated:  05/31/20 1315    Narrative:       The following orders were created for panel order Extra Tubes.  Procedure                               Abnormality         Status                     ---------                               -----------         ------                     Gold Top - SST[[id number]]                                   Final result                 Please view results for these tests on the individual orders.    Gold Top - SST [[id number]] Collected:  05/31/20 1209    Specimen:  Blood Updated:  05/31/20 1315     Extra Tube Hold for add-ons.      Comment: Auto resulted.       Comprehensive Metabolic Panel [[id number]]  (Abnormal) Collected:  05/31/20 1209    Specimen:  Blood Updated:  05/31/20 1240     Glucose 115 mg/dL      BUN 14 mg/dL      Creatinine 1.05 mg/dL      Sodium 136 mmol/L      Potassium 4.3 mmol/L      Chloride 104 mmol/L      CO2 25.0 mmol/L      Calcium 9.5 mg/dL      Total Protein 7.1 g/dL      Albumin 3.90 g/dL      ALT (SGPT) 19 U/L      AST (SGOT) 28 U/L      Alkaline Phosphatase 102 U/L      Total Bilirubin 0.4 mg/dL      eGFR Non African Amer 71 mL/min/1.73      Globulin 3.2 gm/dL      A/G Ratio 1.2 g/dL      BUN/Creatinine Ratio 13.3     Anion Gap 7.0 mmol/L     Narrative:       GFR Normal >60  Chronic Kidney Disease <60  Kidney Failure <15      Magnesium [[id number]]  (Normal) Collected:  05/31/20 1209    Specimen:  Blood Updated:  05/31/20 1240     Magnesium 1.9 mg/dL     Troponin [[id number]]  (Normal) Collected:  05/31/20 1209    Specimen:  Blood Updated:  05/31/20 1240     Troponin T <0.010 ng/mL     Narrative:       Troponin T Reference Range:  <= 0.03 ng/mL-   Negative for AMI  >0.03 ng/mL-     Abnormal for myocardial necrosis.  Clinicians would have to utilize clinical acumen, EKG, Troponin and serial changes to determine if it is an Acute Myocardial Infarction or myocardial injury due to an underlying chronic condition.       Results may be falsely decreased if patient taking Biotin.      BNP [[id number]]  (Normal) Collected:  05/31/20 1209    Specimen:  Blood Updated:  05/31/20 1238     proBNP 105.4 pg/mL     Narrative:       Among patients with dyspnea, NT-proBNP is highly sensitive for the detection of acute congestive heart failure. In addition NT-proBNP of <300 pg/ml effectively rules out acute congestive heart failure with 99% negative predictive value.    Results may be falsely decreased if patient taking Biotin.      Protime-INR [[id number]]  (Normal) Collected:  05/31/20 1209    Specimen:  Blood Updated:  05/31/20 1238      Protime 13.3 Seconds      INR 1.03    Narrative:       Therapeutic range for most indications is 2.0-3.0 INR,  or 2.5-3.5 for mechanical heart valves.    aPTT [[id number]]  (Normal) Collected:  05/31/20 1209    Specimen:  Blood Updated:  05/31/20 1235     PTT 27.0 seconds     Narrative:       The recommended Heparin therapeutic range is 68-97 seconds.    D-dimer, Quantitative [[id number]]  (Abnormal) Collected:  05/31/20 1209    Specimen:  Blood Updated:  05/31/20 1235     D-Dimer, Quantitative 3,483 ng/mL (FEU)     Narrative:       Dimer values <500 ng/ml FEU are FDA approved as aid in diagnosis of deep venous thrombosis and pulmonary embolism.  This test should not be used in an exclusion strategy with pretest probability alone.    A recent guideline regarding diagnosis for pulmonary thromboembolism recommends an adjusted exclusion criterion of age x 10 ng/ml FEU for patients >50 years of age ([employer] Med 2015; 163: 701-711).      CBC & Differential [[id number]] Collected:  05/31/20 1209    Specimen:  Blood Updated:  05/31/20 1216    Narrative:       The following orders were created for panel order CBC & Differential.  Procedure                               Abnormality         Status                     ---------                               -----------         ------                     CBC Auto Differential[[id number]]        Abnormal            Final result                 Please view results for these tests on the individual orders.    CBC Auto Differential [[id number]]  (Abnormal) Collected:  05/31/20 1209    Specimen:  Blood Updated:  05/31/20 1216     WBC 5.07 10*3/mm3      RBC 4.65 10*6/mm3      Hemoglobin 14.3 g/dL      Hematocrit 41.1 %      MCV 88.4 fL      MCH 30.8 pg      MCHC 34.8 g/dL      RDW 12.9 %      RDW-SD 41.5 fl      MPV 9.9 fL      Platelets 179 10*3/mm3      Neutrophil % 48.1 %      Lymphocyte % 35.9 %      Monocyte % 12.2 %      Eosinophil % 2.8 %      Basophil % 0.8 %      Immature  Grans % 0.2 %      Neutrophils, Absolute 2.44 10*3/mm3      Lymphocytes, Absolute 1.82 10*3/mm3      Monocytes, Absolute 0.62 10*3/mm3      Eosinophils, Absolute 0.14 10*3/mm3      Basophils, Absolute 0.04 10*3/mm3      Immature Grans, Absolute 0.01 10*3/mm3      nRBC 0.0 /100 WBC     Urinalysis With Microscopic If Indicated (No Culture) - Urine, Clean Catch [[id number]]  (Normal) Collected:  05/31/20 1145    Specimen:  Urine, Clean Catch Updated:  05/31/20 1153     Color, UA Yellow     Appearance, UA Clear     pH, UA 6.0     Specific Gravity, UA 1.010     Glucose, UA Negative     Ketones, UA Negative     Bilirubin, UA Negative     Blood, UA Negative     Protein, UA Negative     Leuk Esterase, UA Negative     Nitrite, UA Negative     Urobilinogen, UA 0.2 E.U./dL    Narrative:       Urine microscopic not indicated.        Imaging Results (Last 24 Hours)     Procedure Component Value Units Date/Time    CT Angiogram Chest [[id number]] Collected:  05/31/20 1300     Updated:  05/31/20 1347    Narrative:       PROCEDURE: CT ANGIOGRAM CHEST    HISTORY: sob. dvt. Pe suspected    Indication: Same as above    Comparison: 2/28/2017    Technique:     CT of the chest was done with intravenous contrast followed by CT  angiography of the pulmonary arteries.    Coronal, Sagittal and 3D volumetric MIP reconstructions were  generated from the acquired data.    The patient was injected with 61 mL of Isovue-370 radiographic  contrast intravenously, without any documented immediate adverse  reactions.     This exam was performed according to our departmental  dose-optimization program, which includes automated exposure  control, adjustment of the mA and/or KV according to the  patient's size and/or use of iterative reconstruction technique.    FINDINGS:     There is presence of filling defects in the bilateral main  pulmonary arteries, extending peripherally into the main  bilateral segmental and subsegmental branches of the  pulmonary  arteries, suggestive of significant bilateral pulmonary embolism.    Note is again made of pathologically enlarged lymph nodes in the  AP window,, right hilum, right paratracheal region and the  precarinal region, the largest of these lymph nodes seen in the  right paratracheal region measuring 15 mm in short axis  dimension.    There are no discrete airspace infiltrates, pneumothoraces or  pleural effusions. Note is again made of mild bilateral  gynecomastia.    The trachea, bilateral mainstem bronchi and the bilateral main   segmental bronchi are patent without any intraluminal mass  lesions.    There is no gross evidence of clinically significant thoracic  aortic aneurysm or thoracic aortic dissection.     There is no clinically significant pericardial effusion.    There are no pathologically enlarged lymph nodes in the  mediastinum, bilateral hilar, bilateral supraclavicular or the  bilateral axillary regions.    The thoracic bony rib cage appears grossly unremarkable.    The visualized thoracic spine show multilevel degenerative  change.    Note is made of few subcentimeter nonobstructive calculi in the  partially visualized bilateral kidneys.    The findings were discussed with Miss Kiley García PA-C, at 1:43  PM      Impression:         There is presence of filling defects in the bilateral main  pulmonary arteries, extending peripherally into the main  bilateral segmental and subsegmental branches of the pulmonary  arteries, suggestive of significant bilateral pulmonary embolism.    Note is again made of pathologically enlarged lymph nodes in the  AP window,, right hilum, right paratracheal region and the  precarinal region, the largest of these lymph nodes seen in the  right paratracheal region measuring 15 mm in short axis  dimension.    Note is made of few subcentimeter nonobstructive calculi in the  partially visualized bilateral kidneys.    Electronically signed by:  Lance Rankin MD  5/31/2020  1:46 PM CDT  Workstation: [id number]-    US Venous Doppler Lower Extremity Left (duplex) [[id number]] Collected:  05/31/20 1139     Updated:  05/31/20 1221    Narrative:       Doppler duplex venous examination left lower extremity.       CLINICAL INDICATION: Swelling.          COMPARISON: None    FINDINGS:    The common femoral,  femoral, and popliteal veins of the left      lower extremity are well identified. Echogenic noncompressible  thrombi left distal superficial femoral vein and posterior calf  veins.      Impression:       Echogenic noncompressible thrombi distal left  superficial femoral vein above the knee and posterior calf veins.        These findings discussed with Dr. Ronen Ramirez in the emergency  department at 12:19 PM.    Electronically signed by:  Nadeem Holm MD  5/31/2020 12:20 PM CDT  Workstation: [id number]    XR Chest 1 View [[id number]] Collected:  05/31/20 1137     Updated:  05/31/20 1208    Narrative:       PROCEDURE: XR CHEST 1 VIEW    Clinical History: shortness of breath    Indication:     Same as above.    Comparison:     None available at present.    Technique:     Single portable frontal projection of the chest was done.    Findings:    Underlying changes of COPD are noted. There is good position of  the dual chamber left-sided pacemaker wires.    There are no discrete airspace infiltrates, pneumothoraces or  pleural effusions.    The pulmonary vascularity is normal.    The cardiomediastinal contour is  unremarkable.      Impression:       Impression:     There is no acute pleural-parenchymal process seen in the imaged  lung fields.    Electronically signed by:  Lance Rankin MD  5/31/2020 12:07 PM  CDT Workstation: [id number]-              ASSESSMENT/PLAN         Paroxysmal A-fib (CMS/HCC)    Bilateral pulmonary embolism (CMS/HCC)      Assessment & Plan    Acute DVT  Fist occurrence, unprovoked.  Previous bilateral venous intervention, unable to recall exact type of GSV  intervention.  Duplex in 3 months to confirm resolution.  Plan transition to eliquis, will utilize acute dosing interval.  Homocysteine, factor V leiden, factor II due to FH history of DVT/PE.    Acute Pulmonary Embolism Bilateral Lower Lobes  Hemodynamically stable on room air.  Symptoms with rapid improvement with lovenox.  Transition to NOAC.  Eliquis with acute dosing interval.  Plan uninterrupted anticoagulant therapy x6 months and completion of hypercoagulable evaluation.  RVSP 32. No evidence of Right heart failure.      Await echocardiogram results.     Will schedule follow up in office in one month, 30 day free card given for eliquis.  Will follow affordability and coverage in office.     Risks and benefits of anticoagulant therapy discussed at length with patient.  Understands and wishes to proceed.      Thank you for the opportunity to participate in this patient's care.          I discussed the patients findings and my recommendations with Dr. Weathers            This document has been electronically signed by Rob Senior, AGACNP-BC @  On June 1, 2020 10:26

## 2020-06-02 VITALS
HEIGHT: 72 IN | TEMPERATURE: 97.6 F | HEART RATE: 60 BPM | WEIGHT: 247.2 LBS | SYSTOLIC BLOOD PRESSURE: 109 MMHG | BODY MASS INDEX: 33.48 KG/M2 | DIASTOLIC BLOOD PRESSURE: 61 MMHG | RESPIRATION RATE: 18 BRPM | OXYGEN SATURATION: 94 %

## 2020-06-02 LAB
ANION GAP SERPL CALCULATED.3IONS-SCNC: 6 MMOL/L (ref 5–15)
BASOPHILS # BLD AUTO: 0.04 10*3/MM3 (ref 0–0.2)
BASOPHILS NFR BLD AUTO: 0.7 % (ref 0–1.5)
BUN BLD-MCNC: 15 MG/DL (ref 8–23)
BUN/CREAT SERPL: 14.7 (ref 7–25)
CALCIUM SPEC-SCNC: 9.1 MG/DL (ref 8.6–10.5)
CHLORIDE SERPL-SCNC: 102 MMOL/L (ref 98–107)
CO2 SERPL-SCNC: 25 MMOL/L (ref 22–29)
CREAT BLD-MCNC: 1.02 MG/DL (ref 0.76–1.27)
DEPRECATED RDW RBC AUTO: 41.3 FL (ref 37–54)
EOSINOPHIL # BLD AUTO: 0.15 10*3/MM3 (ref 0–0.4)
EOSINOPHIL NFR BLD AUTO: 2.7 % (ref 0.3–6.2)
ERYTHROCYTE [DISTWIDTH] IN BLOOD BY AUTOMATED COUNT: 12.7 % (ref 12.3–15.4)
GFR SERPL CREATININE-BSD FRML MDRD: 74 ML/MIN/1.73
GLUCOSE BLD-MCNC: 120 MG/DL (ref 65–99)
HCT VFR BLD AUTO: 41 % (ref 37.5–51)
HGB BLD-MCNC: 14.3 G/DL (ref 13–17.7)
IMM GRANULOCYTES # BLD AUTO: 0.01 10*3/MM3 (ref 0–0.05)
IMM GRANULOCYTES NFR BLD AUTO: 0.2 % (ref 0–0.5)
LYMPHOCYTES # BLD AUTO: 1.63 10*3/MM3 (ref 0.7–3.1)
LYMPHOCYTES NFR BLD AUTO: 29.9 % (ref 19.6–45.3)
MCH RBC QN AUTO: 30.8 PG (ref 26.6–33)
MCHC RBC AUTO-ENTMCNC: 34.9 G/DL (ref 31.5–35.7)
MCV RBC AUTO: 88.4 FL (ref 79–97)
MONOCYTES # BLD AUTO: 0.69 10*3/MM3 (ref 0.1–0.9)
MONOCYTES NFR BLD AUTO: 12.6 % (ref 5–12)
NEUTROPHILS # BLD AUTO: 2.94 10*3/MM3 (ref 1.7–7)
NEUTROPHILS NFR BLD AUTO: 53.9 % (ref 42.7–76)
NRBC BLD AUTO-RTO: 0 /100 WBC (ref 0–0.2)
PLATELET # BLD AUTO: 194 10*3/MM3 (ref 140–450)
PMV BLD AUTO: 10 FL (ref 6–12)
POTASSIUM BLD-SCNC: 4.3 MMOL/L (ref 3.5–5.2)
RBC # BLD AUTO: 4.64 10*6/MM3 (ref 4.14–5.8)
SODIUM BLD-SCNC: 133 MMOL/L (ref 136–145)
WBC NRBC COR # BLD: 5.46 10*3/MM3 (ref 3.4–10.8)

## 2020-06-02 PROCEDURE — 99231 SBSQ HOSP IP/OBS SF/LOW 25: CPT | Performed by: NURSE PRACTITIONER

## 2020-06-02 PROCEDURE — 80048 BASIC METABOLIC PNL TOTAL CA: CPT | Performed by: HOSPITALIST

## 2020-06-02 PROCEDURE — 85025 COMPLETE CBC W/AUTO DIFF WBC: CPT | Performed by: HOSPITALIST

## 2020-06-02 RX ORDER — ASPIRIN 81 MG/1
81 TABLET, CHEWABLE ORAL DAILY
Qty: 30 TABLET | Refills: 1 | Status: SHIPPED | OUTPATIENT
Start: 2020-06-03

## 2020-06-02 RX ADMIN — DRONEDARONE 400 MG: 400 TABLET, FILM COATED ORAL at 08:09

## 2020-06-02 RX ADMIN — ASPIRIN 81 MG 81 MG: 81 TABLET ORAL at 08:09

## 2020-06-02 RX ADMIN — SODIUM CHLORIDE, PRESERVATIVE FREE 10 ML: 5 INJECTION INTRAVENOUS at 08:10

## 2020-06-02 RX ADMIN — APIXABAN 10 MG: 5 TABLET, FILM COATED ORAL at 08:09

## 2020-06-02 NOTE — DISCHARGE SUMMARY
Discharge Summary  Rudi Palafox MD  Hospitalist     Date of Discharge:  6/2/2020    Discharge Diagnosis:      Bilateral pulmonary embolism (CMS/HCC)    Leg DVT (deep venous thromboembolism), acute, left (CMS/HCC)    Atrial fibrillation (CMS/HCC)      Presenting Problem/History of Present Illness  Swollen L calf    Hospital Course  Patient is a 64 y.o. male admitted for a swollen L calf due to a DVT complicated by bilateral pulmonary embolism. He improved with the help of symptomatic treatment, Eliquis and supportive care. His vital signs are stable, his Oxygen saturation is 94% on room air.    Consults:   Consults     Date and Time Order Name Status Description    5/31/2020 1400 Inpatient Cardiothoracic Surgery Consult Completed           Pertinent Test Results: normal LV EF with a mild degree of LVH on the Echo    Condition on Discharge:  stable    Vital Signs  Temp:  [96.4 °F (35.8 °C)-98.4 °F (36.9 °C)] 97.6 °F (36.4 °C)  Heart Rate:  [59-73] 60  Resp:  [16-18] 18  BP: (109-142)/(61-84) 109/61    Physical Exam:  Physical Exam   Constitutional: He is oriented to person, place, and time. No distress.   Obese    HENT:   Head: Normocephalic and atraumatic.   Eyes: Pupils are equal, round, and reactive to light. EOM are normal. No scleral icterus.   Neck: Normal range of motion. Neck supple.   Cardiovascular: Normal rate and regular rhythm.   Pulmonary/Chest: Effort normal and breath sounds normal. No stridor. No respiratory distress.   Abdominal: Soft. Bowel sounds are normal. He exhibits no distension. There is no tenderness. There is no rebound.   Musculoskeletal: Normal range of motion. He exhibits no edema or tenderness.   Neurological: He is alert and oriented to person, place, and time. No cranial nerve deficit. Coordination normal.   Skin: Skin is warm and dry. No rash noted. No pallor.   Psychiatric: He has a normal mood and affect. His behavior is normal.   Vitals reviewed.      Discharge Disposition  Home  "or Self Care    Discharge Medications     Discharge Medications      New Medications      Instructions Start Date   apixaban 5 MG tablet tablet  Commonly known as:  ELIQUIS   10 mg, Oral, Every 12 Hours Scheduled      apixaban 5 MG tablet tablet  Commonly known as:  ELIQUIS   5 mg, Oral, Every 12 Hours Scheduled   Start Date:  June 8, 2020     aspirin 81 MG chewable tablet  Replaces:  aspirin 325 MG tablet   81 mg, Oral, Daily   Start Date:  Susy 3, 2020        Continue These Medications      Instructions Start Date   dronedarone 400 MG tablet  Commonly known as:  Multaq   400 mg, Oral, 2 Times Daily With Meals      Glucosamine-Chondroitin 250-200 MG tablet   Oral      MULTIVITAL PO   1 tablet, Oral, Daily         Stop These Medications    aspirin 325 MG tablet  Replaced by:  aspirin 81 MG chewable tablet            Discharge Diet:   Diet Instructions     Diet: Regular      Discharge Diet:  Regular          Activity at Discharge:   Activity Instructions     Activity as Tolerated            Follow-up Appointments  Future Appointments   Date Time Provider Department Center   6/22/2020  9:00 AM Buzz Senior APRN [facility] None     Additional Instructions for the Follow-ups that You Need to Schedule     Discharge Follow-up with PCP   As directed       Currently Documented PCP:    Provider, No Known    PCP Phone Number:    None     Follow Up Details:  in 1 week         Referral to Anticoagulation Monitoring   As directed      Within 1 week     Select To DEPT SPEC to filter TO DEPT       Send INR reminders to the \"clinical pool\" of the TO DEPT     (ie:  [facility]  or [facility])     Order Comments:  Within 1 week                Test Results Pending at Discharge   Order Current Status    Factor 5 Leiden In process    Factor II, DNA Analysis In process           Rudi Palafox MD  06/02/20  11:09        "

## 2020-06-02 NOTE — PROGRESS NOTES
"  Cardiothoracic - Vascular Surgery Daily Note        LOS: 2 days   Patient Care Team:  Provider, No Known as PCP - General    Chief Complaint: Dyspnea      Subjective     The following portions of the patient's history were reviewed and updated as appropriate: allergies, current medications, past family history, past medical history, past social history, past surgical history and problem list.     Subjective:  Symptoms:  Improved.  No shortness of breath or chest pain.    Diet:  Adequate intake.    Activity level: Normal.    Pain:  He reports no pain.          Objective     Vital Signs  Temp:  [96.4 °F (35.8 °C)-98.4 °F (36.9 °C)] 97.6 °F (36.4 °C)  Heart Rate:  [59-73] 60  Resp:  [16-18] 18  BP: (109-142)/(61-84) 109/61  Body mass index is 33.53 kg/m².    Intake/Output Summary (Last 24 hours) at 6/2/2020 0946  Last data filed at 6/2/2020 0835  Gross per 24 hour   Intake 2580 ml   Output --   Net 2580 ml     I/O this shift:  In: 480 [P.O.:480]  Out: -     Wt Readings from Last 3 Encounters:   06/02/20 112 kg (247 lb 3.2 oz)   05/06/20 107 kg (235 lb)   10/25/19 107 kg (235 lb)         Physical Exam   Objective:  General Appearance:  Comfortable, well-appearing, in no acute distress and not in pain.    Vital signs: (most recent): Blood pressure 109/61, pulse 60, temperature 97.6 °F (36.4 °C), resp. rate 18, height 182.9 cm (72\"), weight 112 kg (247 lb 3.2 oz), SpO2 94 %.  Vital signs are normal.  No fever.    Output: Producing urine and producing stool.    HEENT: Normal HEENT exam.    Lungs:  Normal effort and normal respiratory rate.  Breath sounds clear to auscultation.    Heart: Normal rate.  Regular rhythm.    Chest: No chest wall tenderness.    Abdomen: Abdomen is soft.  Bowel sounds are normal.   There is no abdominal tenderness.     Extremities: Normal range of motion.    Pulses: Distal pulses are intact.    Neurological: Patient is alert and oriented to person, place and time.  GCS score is 15.    Pupils:  " Pupils are equal, round, and reactive to light.  Pupils are equal.   Skin:  Warm and dry.                Results Review:    Lab Results   Component Value Date    WBC 5.46 06/02/2020    HGB 14.3 06/02/2020    HCT 41.0 06/02/2020    MCV 88.4 06/02/2020     06/02/2020     Lab Results   Component Value Date    GLUCOSE 120 (H) 06/02/2020    BUN 15 06/02/2020    CREATININE 1.02 06/02/2020    EGFRIFNONA 74 06/02/2020    BCR 14.7 06/02/2020    K 4.3 06/02/2020    CO2 25.0 06/02/2020    CALCIUM 9.1 06/02/2020    ALBUMIN 3.90 05/31/2020    AST 28 05/31/2020    ALT 19 05/31/2020       Calcium Calcium   Date/Time Value Ref Range Status   06/02/2020 0602 9.1 8.6 - 10.5 mg/dL Final   06/01/2020 0611 9.1 8.6 - 10.5 mg/dL Final   05/31/2020 1209 9.5 8.6 - 10.5 mg/dL Final      Magnesium Magnesium   Date/Time Value Ref Range Status   05/31/2020 1209 1.9 1.6 - 2.4 mg/dL Final        Imaging Results (Last 24 Hours)     ** No results found for the last 24 hours. **                                apixaban 10 mg Oral Q12H   Followed by      [START ON 6/8/2020] apixaban 5 mg Oral Q12H   aspirin 81 mg Oral Daily   dronedarone 400 mg Oral BID With Meals   sodium chloride 10 mL Intravenous Q12H                ASSESSMENT/PLAN     Acute DVT  Fist occurrence, unprovoked.  Previous bilateral venous intervention, unable to recall exact type of GSV intervention, will request records for office appt.  Duplex in 3 months to confirm resolution.  Homocysteine normal, pending factor II, factor V leiden. Will follow up in office.      Acute Pulmonary Embolism Bilateral Lower Lobes  Hemodynamically stable on room air.  Symptoms rapidly improving.  Follow up in office scheduled June 22.    Eliquis with acute dosing interval, contact office for s/s of adverse bleeding event.     Echocardiogram reviewed, stable RV pressures, normal function.     CTVS will sign off, patient anticipates discharge.     Risks and benefits of anticoagulant therapy  discussed at length with patient.  Understands and wishes to proceed.          This document has been electronically signed by KYLEIGH Shepard-BC @  On June 2, 2020 09:46

## 2020-06-02 NOTE — PLAN OF CARE
Problem: Patient Care Overview  Goal: Plan of Care Review  Outcome: Ongoing (interventions implemented as appropriate)  Flowsheets (Taken 6/2/2020 0601)  Progress: improving  Plan of Care Reviewed With: patient  Outcome Summary: initial assessment; VSS; no complaints throughout the night; will continue to monitor

## 2020-06-03 LAB
F5 GENE MUT ANL BLD/T: NORMAL
FACTOR II, DNA ANALYSIS: NORMAL

## 2020-06-22 ENCOUNTER — LAB (OUTPATIENT)
Dept: LAB | Facility: HOSPITAL | Age: 65
End: 2020-06-22

## 2020-06-22 ENCOUNTER — OFFICE VISIT (OUTPATIENT)
Dept: CARDIAC SURGERY | Facility: CLINIC | Age: 65
End: 2020-06-22

## 2020-06-22 VITALS
HEIGHT: 72 IN | OXYGEN SATURATION: 99 % | BODY MASS INDEX: 34.86 KG/M2 | SYSTOLIC BLOOD PRESSURE: 142 MMHG | WEIGHT: 257.4 LBS | HEART RATE: 76 BPM | DIASTOLIC BLOOD PRESSURE: 88 MMHG

## 2020-06-22 DIAGNOSIS — I26.99 BILATERAL PULMONARY EMBOLISM (HCC): ICD-10-CM

## 2020-06-22 DIAGNOSIS — I82.4Z2 ACUTE DEEP VEIN THROMBOSIS (DVT) OF DISTAL VEIN OF LEFT LOWER EXTREMITY (HCC): ICD-10-CM

## 2020-06-22 DIAGNOSIS — I82.402 LEG DVT (DEEP VENOUS THROMBOEMBOLISM), ACUTE, LEFT (HCC): Primary | Chronic | ICD-10-CM

## 2020-06-22 DIAGNOSIS — I82.402 LEG DVT (DEEP VENOUS THROMBOEMBOLISM), ACUTE, LEFT (HCC): Chronic | ICD-10-CM

## 2020-06-22 LAB
DEPRECATED RDW RBC AUTO: 41.4 FL (ref 37–54)
ERYTHROCYTE [DISTWIDTH] IN BLOOD BY AUTOMATED COUNT: 12.8 % (ref 12.3–15.4)
HCT VFR BLD AUTO: 43.4 % (ref 37.5–51)
HGB BLD-MCNC: 14.9 G/DL (ref 13–17.7)
MCH RBC QN AUTO: 30.4 PG (ref 26.6–33)
MCHC RBC AUTO-ENTMCNC: 34.3 G/DL (ref 31.5–35.7)
MCV RBC AUTO: 88.6 FL (ref 79–97)
PLATELET # BLD AUTO: 235 10*3/MM3 (ref 140–450)
PMV BLD AUTO: 10.5 FL (ref 6–12)
RBC # BLD AUTO: 4.9 10*6/MM3 (ref 4.14–5.8)
WBC NRBC COR # BLD: 4.48 10*3/MM3 (ref 3.4–10.8)

## 2020-06-22 PROCEDURE — 85027 COMPLETE CBC AUTOMATED: CPT

## 2020-06-22 PROCEDURE — 36415 COLL VENOUS BLD VENIPUNCTURE: CPT

## 2020-06-22 PROCEDURE — 99214 OFFICE O/P EST MOD 30 MIN: CPT | Performed by: NURSE PRACTITIONER

## 2020-06-22 NOTE — PROGRESS NOTES
CVTS Office Progress Note     6/22/2020    Baljinder Blanca  1955    Chief Complaint:    Chief Complaint   Patient presents with   • Deep Vein Thrombosis   • Pulmonary Embolism       HPI:      PCP:  Dr. Anderson    64 y.o. male with HTN(stable, increased risk stroke, rupture), Hyperlipidemia(stable, increased risk cardiovascular events), Obesity(uncontrolled, increased risk cardiovascular events) and Atrial fibrillation(stable, increased risk stroke)  SSS status post pacemaker, bilateral venous insuffiency (vein work in [place] both sides. never smoked.  FH of DVT/PE no confirmed genetic cause at this time.  Patient presented to [facility] on 5/31 with complaints of shortness of breath x4 days gradually worsening.  LLE swelling has been present for several weeks preceding admission.  Lower extremity duplex was positive for DVT in left SFV and distal posterior calf vessels.  CTA was obtained demonstrating bilateral lower lobe PE and CTVS Dr. Weathers was consulted for further recommendations.   No other associated symptoms or modifying factors.     2015 Left GSV ablation Dr. Elliott, followed in [place] vein clinic     10/2018 Echo: EF 60%, grade I DD, mild TR, RVDD 32mm  5/31/2020 Left LE Duplex: Acute DVT L SFV, Posterior calf vessels  5/31/2020 CTA Chest: Bilateral lower lobe pulmonary embolus. Renal calculi.      6/1/2020 Echo: Pending at time of consultation, preliminary RVSP 32      The following portions of the patient's history were reviewed and updated as appropriate: allergies, current medications, past family history, past medical history, past social history, past surgical history and problem list.  Recent images independently reviewed.  Available laboratory values reviewed.    PMH:  Past Medical History:   Diagnosis Date   • Arrhythmia    • Atrial fibrillation (CMS/HCC)    • SSS (sick sinus syndrome) (CMS/HCC)      Past Surgical History:   Procedure Laterality Date   • GALLBLADDER SURGERY     •  INSERT / REPLACE / REMOVE PACEMAKER     • SKIN LESION EXCISION     • VASECTOMY     • VEIN SURGERY       Family History   Problem Relation Age of Onset   • No Known Problems Mother    • No Known Problems Father      Social History     Tobacco Use   • Smoking status: Never Smoker   • Smokeless tobacco: Current User     Types: Snuff   Substance Use Topics   • Alcohol use: No   • Drug use: No       ALLERGIES:  No Known Allergies      MEDICATIONS:    Current Outpatient Medications:   •  aspirin 81 MG chewable tablet, Chew 1 tablet Daily., Disp: 30 tablet, Rfl: 1  •  dronedarone (MULTAQ) 400 MG tablet, Take 1 tablet by mouth 2 (Two) Times a Day With Meals., Disp: 180 tablet, Rfl: 3  •  apixaban (ELIQUIS) 5 MG tablet tablet, Take 1 tablet by mouth Every 12 (Twelve) Hours., Disp: 60 tablet, Rfl: 5  •  Glucosamine-Chondroitin 250-200 MG tablet, Take  by mouth., Disp: , Rfl:   •  Multiple Vitamins-Minerals (MULTIVITAL PO), Take 1 tablet by mouth Daily., Disp: , Rfl:       Review of Systems   Constitution: Negative for chills, decreased appetite, fever and weight loss.   HENT: Negative for congestion, nosebleeds and sore throat.    Eyes: Negative for blurred vision, visual disturbance and visual halos.   Cardiovascular: Negative for chest pain, dyspnea on exertion and leg swelling.   Respiratory: Negative for cough, shortness of breath, sputum production and wheezing.    Endocrine: Negative for cold intolerance and polyuria.   Hematologic/Lymphatic: Negative for bleeding problem. Bruises/bleeds easily.        Does not report S/S of adverse bleeding event including nose bleeds, hematuria, melena, gingival bleeding, or hematemesis.   Skin: Negative for flushing, nail changes and unusual hair distribution.   Musculoskeletal: Positive for arthritis, back pain and joint pain.   Gastrointestinal: Negative for bloating, abdominal pain, hematemesis, melena, nausea and vomiting.   Genitourinary: Negative for flank pain and hematuria.  "  Neurological: Negative for brief paralysis, difficulty with concentration, focal weakness, light-headedness, loss of balance, numbness, paresthesias and weakness.        No amaurosis fugax, TIA, or CVA.     Psychiatric/Behavioral: Negative for altered mental status, depression, substance abuse and suicidal ideas.   Allergic/Immunologic: Negative for hives and persistent infections.         Vitals:    06/22/20 0903   BP: 142/88   BP Location: Left arm   Pulse: 76   SpO2: 99%   Weight: 117 kg (257 lb 6.4 oz)   Height: 182.9 cm (72\")     Physical Exam   Constitutional: He is oriented to person, place, and time. He appears well-developed and well-nourished.   HENT:   Head: Normocephalic and atraumatic.   Mouth/Throat: Oropharynx is clear and moist.   Eyes: Pupils are equal, round, and reactive to light. Conjunctivae and EOM are normal.   Neck: Normal range of motion. Neck supple. No JVD present.   Cardiovascular: Normal rate, regular rhythm, normal heart sounds and intact distal pulses.   No murmur heard.  Pulmonary/Chest: Effort normal and breath sounds normal. No respiratory distress.   Abdominal: Soft. Bowel sounds are normal. He exhibits no distension. There is no tenderness.   Musculoskeletal: Normal range of motion. He exhibits edema (mild LLE). He exhibits no tenderness.   Lymphadenopathy:     He has no cervical adenopathy.   Neurological: He is alert and oriented to person, place, and time. No cranial nerve deficit. Coordination normal.   Skin: Skin is warm and dry. Capillary refill takes less than 2 seconds. He is not diaphoretic. No erythema.   There is no evidence of skin breakdown of the bilateral lower extremities.  BLE pink, no evidence of ischemia.  Feet are absent of dependent rubor.   Psychiatric: He has a normal mood and affect. His behavior is normal. Judgment normal.   Nursing note and vitals reviewed.      Assessment & Plan     Independent Review of Studies  Lab Results   Component Value Date    " WBC 4.48 06/22/2020    HGB 14.9 06/22/2020    HCT 43.4 06/22/2020    MCV 88.6 06/22/2020     06/22/2020       1. Leg DVT (deep venous thromboembolism), acute, left (CMS/HCC)  Minimal swelling, compression stocking as needed for comfort  Repeat duplex in 3 months    - CBC (No Diff); Future  - Duplex Venous Lower Extremity - Left CAR; Future    2. Bilateral pulmonary embolism (CMS/HCC)  On room air, echocardiogram performed in hospital with stable RVSP.   Repeat CTA in 6 months, complete hypercoagulable work up after discontinuation of eliquis.      Homocysteine, factor II, factor V negative.     Continue eliquis to complete 6 months of uninterrupted anticoagulation    FH of DVT/PE    3. Anticoagulation  Adverse bleeding events that require evaluation includes blood in the urine, stool, gums, and nose.  If you are to experience these symptoms you should present to the heart and vascular center for evaluation.  Avoid NSAID's such as ibuprofen and naproxen for pain relief as these medications increase your risk of gastrointestinal bleeding.  Over the counter supplements such as garlic, gingko biloba, and other herbs may increase bleeding risks.     Copay affordable    Detailed discussion regarding risks, benefits, and treatment plan. Images independently reviewed. Patient understands, agrees, and wishes to proceed with plan.       This document has been electronically signed by COURTNEY Shepard @  On June 22, 2020 11:35      EMR Dragon/Transcription disclaimer:   Much of this encounter note is an electronic transcription/translation of spoken language to printed text. The electronic translation of spoken language may permit erroneous, or at times, nonsensical words or phrases to be inadvertently transcribed; Although I have reviewed the note for such errors, some may still exist.

## 2020-06-22 NOTE — PATIENT INSTRUCTIONS
Adverse bleeding events that require evaluation includes blood in the urine, stool, gums, and nose.  If you are to experience these symptoms you should present to the heart and vascular center for evaluation.  Avoid NSAID's such as ibuprofen and naproxen for pain relief as these medications increase your risk of gastrointestinal bleeding.  Over the counter supplements such as garlic, gingko biloba, and other herbs may increase bleeding risks. Do not stop your medication unless directed by a provider, take care in making sure you have adequate supply so that you are not without medication. If this medicine becomes unaffordable please contact heart and vascular center for evaluation before discontinuing.    Will call with abnormal lab work    Follow up 3 months repeat ultrasound.

## 2020-07-28 DIAGNOSIS — I48.0 PAROXYSMAL A-FIB (HCC): ICD-10-CM

## 2020-09-29 ENCOUNTER — OFFICE VISIT (OUTPATIENT)
Dept: CARDIAC SURGERY | Facility: CLINIC | Age: 65
End: 2020-09-29

## 2020-09-29 VITALS
HEIGHT: 72 IN | HEART RATE: 70 BPM | BODY MASS INDEX: 34.54 KG/M2 | SYSTOLIC BLOOD PRESSURE: 160 MMHG | DIASTOLIC BLOOD PRESSURE: 94 MMHG | WEIGHT: 255 LBS | OXYGEN SATURATION: 99 %

## 2020-09-29 DIAGNOSIS — I26.99 BILATERAL PULMONARY EMBOLISM (HCC): ICD-10-CM

## 2020-09-29 DIAGNOSIS — I82.402 LEG DVT (DEEP VENOUS THROMBOEMBOLISM), ACUTE, LEFT (HCC): Chronic | ICD-10-CM

## 2020-09-29 DIAGNOSIS — I48.0 PAROXYSMAL ATRIAL FIBRILLATION (HCC): Primary | Chronic | ICD-10-CM

## 2020-09-29 PROCEDURE — 99213 OFFICE O/P EST LOW 20 MIN: CPT | Performed by: NURSE PRACTITIONER

## 2020-09-29 NOTE — PROGRESS NOTES
CVTS Office Progress Note     9/29/2020    Baljinder Blanca  1955    Chief Complaint:    Chief Complaint   Patient presents with   • Deep Vein Thrombosis       HPI:      PCP:  Dr. Anderson    64 y.o. male with HTN(stable, increased risk stroke, rupture), Hyperlipidemia(stable, increased risk cardiovascular events), Obesity(uncontrolled, increased risk cardiovascular events) and Atrial fibrillation(stable, increased risk stroke)  SSS status post pacemaker, bilateral venous insuffiency (vein work in [place] both sides. never smoked.  FH of DVT/PE no confirmed genetic cause at this time.  Patient presented to [facility] on 5/31 with complaints of shortness of breath x4 days gradually worsening.  LLE swelling has been present for several weeks preceding admission.  Lower extremity duplex was positive for DVT in left SFV and distal posterior calf vessels.  CTA was obtained demonstrating bilateral lower lobe PE and CTVS Dr. Weathers was consulted for further recommendations.   No other associated symptoms or modifying factors.     2015 Left GSV ablation Dr. Elliott, followed in [place] vein clinic     10/2018 Echo: EF 60%, grade I DD, mild TR, RVDD 32mm  5/31/2020 Left LE Duplex: Acute DVT L SFV, Posterior calf vessels  5/31/2020 CTA Chest: Bilateral lower lobe pulmonary embolus. Renal calculi.   9/2020 LLE Venous: Negative for DVT, SFJ reflux, prior L GSV ablation.      6/1/2020 Echo: Pending at time of consultation, preliminary RVSP 32      The following portions of the patient's history were reviewed and updated as appropriate: allergies, current medications, past family history, past medical history, past social history, past surgical history and problem list.  Recent images independently reviewed.  Available laboratory values reviewed.    PMH:  Past Medical History:   Diagnosis Date   • Arrhythmia    • Atrial fibrillation (CMS/HCC)    • SSS (sick sinus syndrome) (CMS/HCC)      Past Surgical History:    Procedure Laterality Date   • GALLBLADDER SURGERY     • INSERT / REPLACE / REMOVE PACEMAKER     • SKIN LESION EXCISION     • VASECTOMY     • VEIN SURGERY       Family History   Problem Relation Age of Onset   • No Known Problems Mother    • No Known Problems Father      Social History     Tobacco Use   • Smoking status: Never Smoker   • Smokeless tobacco: Current User     Types: Snuff   Substance Use Topics   • Alcohol use: No   • Drug use: No       ALLERGIES:  No Known Allergies      MEDICATIONS:    Current Outpatient Medications:   •  apixaban (ELIQUIS) 5 MG tablet tablet, Take 1 tablet by mouth Every 12 (Twelve) Hours., Disp: 60 tablet, Rfl: 5  •  aspirin 81 MG chewable tablet, Chew 1 tablet Daily., Disp: 30 tablet, Rfl: 1  •  dronedarone (Multaq) 400 MG tablet, Take 1 tablet by mouth 2 (Two) Times a Day With Meals., Disp: 180 tablet, Rfl: 3  •  Glucosamine-Chondroitin 250-200 MG tablet, Take  by mouth., Disp: , Rfl:   •  Multiple Vitamins-Minerals (MULTIVITAL PO), Take 1 tablet by mouth Daily., Disp: , Rfl:       Review of Systems   Constitution: Negative for chills, decreased appetite, fever and weight loss.   HENT: Negative for congestion, nosebleeds and sore throat.    Eyes: Negative for blurred vision, visual disturbance and visual halos.   Cardiovascular: Negative for chest pain, dyspnea on exertion and leg swelling.   Respiratory: Negative for cough, shortness of breath, sputum production and wheezing.    Endocrine: Negative for cold intolerance and polyuria.   Hematologic/Lymphatic: Negative for bleeding problem. Bruises/bleeds easily.        Does not report S/S of adverse bleeding event including nose bleeds, hematuria, melena, gingival bleeding, or hematemesis.   Skin: Negative for flushing, nail changes and unusual hair distribution.   Musculoskeletal: Positive for arthritis, back pain and joint pain.   Gastrointestinal: Negative for bloating, abdominal pain, hematemesis, melena, nausea and vomiting.  "  Genitourinary: Negative for flank pain and hematuria.   Neurological: Negative for brief paralysis, difficulty with concentration, focal weakness, light-headedness, loss of balance, numbness, paresthesias and weakness.        No amaurosis fugax, TIA, or CVA.     Psychiatric/Behavioral: Negative for altered mental status, depression, substance abuse and suicidal ideas.   Allergic/Immunologic: Negative for hives and persistent infections.         Vitals:    09/29/20 1021   BP: 160/94   BP Location: Left arm   Patient Position: Sitting   Pulse: 70   SpO2: 99%   Weight: 116 kg (255 lb)   Height: 182.9 cm (72\")     Physical Exam   Constitutional: He is oriented to person, place, and time. He appears well-developed.   HENT:   Head: Normocephalic and atraumatic.   Eyes: Pupils are equal, round, and reactive to light. Conjunctivae are normal.   Neck: Normal range of motion. Neck supple. No JVD present.   Cardiovascular: Normal rate, regular rhythm and normal heart sounds.   No murmur heard.  Pulmonary/Chest: Effort normal and breath sounds normal. No respiratory distress.   Abdominal: Soft. Bowel sounds are normal. He exhibits no distension. There is no abdominal tenderness.   Musculoskeletal: Normal range of motion. No tenderness.   Lymphadenopathy:     He has no cervical adenopathy.   Neurological: He is alert and oriented to person, place, and time. No cranial nerve deficit. Coordination normal.   Skin: Skin is warm and dry. Capillary refill takes less than 2 seconds. He is not diaphoretic. No erythema.   There is no evidence of skin breakdown of the bilateral lower extremities.  BLE pink, no evidence of ischemia.  Feet are absent of dependent rubor.   Psychiatric: His behavior is normal. Judgment normal.   Nursing note and vitals reviewed.      Assessment & Plan     Independent Review of Studies  9/2020 LLE Venous: Negative for DVT, SFJ reflux, prior L GSV ablation.     1. Leg DVT (deep venous thromboembolism), acute, " left (CMS/HCC)  Duplex today negative for VTE  Compression stockings PRN    2. Bilateral pulmonary embolism (CMS/HCC)  On room air, echocardiogram performed in hospital with stable RVSP.     Preliminary hypercoag panel negative    He also has paroxysmal atrial fibrillation, lengthy discussion was held regarding risks and benefits of discontinuing anticoagulation.     Previously not on anticoagulation due to low JJ?DS?-VASc score.  Now he has a score of 3, would recommend long term use of eliquis.     Follow up 6 months repeat CBC    3. Anticoagulation  Adverse bleeding events that require evaluation includes blood in the urine, stool, gums, and nose.  If you are to experience these symptoms you should present to the heart and vascular center for evaluation.  Avoid NSAID's such as ibuprofen and naproxen for pain relief as these medications increase your risk of gastrointestinal bleeding.  Over the counter supplements such as garlic, gingko biloba, and other herbs may increase bleeding risks.     Copay affordable    Detailed discussion regarding risks, benefits, and treatment plan. Images independently reviewed. Patient understands, agrees, and wishes to proceed with plan.       This document has been electronically signed by COURTNEY Shepard  On September 29, 2020 11:14 CDT      EMR Dragon/Transcription disclaimer:   Much of this encounter note is an electronic transcription/translation of spoken language to printed text. The electronic translation of spoken language may permit erroneous, or at times, nonsensical words or phrases to be inadvertently transcribed; Although I have reviewed the note for such errors, some may still exist.

## 2020-09-29 NOTE — PATIENT INSTRUCTIONS
Adverse bleeding events that require evaluation includes blood in the urine, stool, gums, and nose.  If you are to experience these symptoms you should present to the heart and vascular center for evaluation.  Avoid NSAID's such as ibuprofen and naproxen for pain relief as these medications increase your risk of gastrointestinal bleeding.  Over the counter supplements such as garlic, gingko biloba, and other herbs may increase bleeding risks. Do not stop your medication unless directed by a provider, take care in making sure you have adequate supply so that you are not without medication. If this medicine becomes unaffordable please contact heart and vascular center for evaluation before discontinuing.    CBC at next visit.

## 2020-12-28 RX ORDER — APIXABAN 5 MG/1
TABLET, FILM COATED ORAL
Qty: 60 TABLET | Refills: 2 | Status: SHIPPED | OUTPATIENT
Start: 2020-12-28 | End: 2021-03-23

## 2021-02-25 PROCEDURE — 93294 REM INTERROG EVL PM/LDLS PM: CPT | Performed by: NURSE PRACTITIONER

## 2021-02-25 PROCEDURE — 93296 REM INTERROG EVL PM/IDS: CPT | Performed by: NURSE PRACTITIONER

## 2021-03-23 RX ORDER — APIXABAN 5 MG/1
TABLET, FILM COATED ORAL
Qty: 60 TABLET | Refills: 2 | Status: SHIPPED | OUTPATIENT
Start: 2021-03-23 | End: 2021-06-15

## 2021-03-26 ENCOUNTER — IMMUNIZATION (OUTPATIENT)
Dept: VACCINE CLINIC | Facility: HOSPITAL | Age: 66
End: 2021-03-26

## 2021-03-26 PROCEDURE — 91300 HC SARSCOV02 VAC 30MCG/0.3ML IM: CPT | Performed by: NURSE PRACTITIONER

## 2021-03-26 PROCEDURE — 0001A: CPT | Performed by: NURSE PRACTITIONER

## 2021-03-29 DIAGNOSIS — I48.0 PAROXYSMAL ATRIAL FIBRILLATION (HCC): Primary | ICD-10-CM

## 2021-03-30 ENCOUNTER — OFFICE VISIT (OUTPATIENT)
Dept: CARDIAC SURGERY | Facility: CLINIC | Age: 66
End: 2021-03-30

## 2021-03-30 ENCOUNTER — LAB (OUTPATIENT)
Dept: LAB | Facility: HOSPITAL | Age: 66
End: 2021-03-30

## 2021-03-30 VITALS
DIASTOLIC BLOOD PRESSURE: 90 MMHG | OXYGEN SATURATION: 98 % | HEIGHT: 72 IN | HEART RATE: 80 BPM | BODY MASS INDEX: 34.67 KG/M2 | SYSTOLIC BLOOD PRESSURE: 158 MMHG | WEIGHT: 256 LBS

## 2021-03-30 DIAGNOSIS — Z86.711 HISTORY OF PULMONARY EMBOLISM: ICD-10-CM

## 2021-03-30 DIAGNOSIS — Z86.718 HISTORY OF DVT (DEEP VEIN THROMBOSIS): ICD-10-CM

## 2021-03-30 DIAGNOSIS — I48.0 PAROXYSMAL ATRIAL FIBRILLATION (HCC): Primary | ICD-10-CM

## 2021-03-30 DIAGNOSIS — I48.0 PAROXYSMAL ATRIAL FIBRILLATION (HCC): ICD-10-CM

## 2021-03-30 DIAGNOSIS — Z71.89 ENCOUNTER FOR ANTICOAGULATION DISCUSSION AND COUNSELING: ICD-10-CM

## 2021-03-30 PROBLEM — I26.99 BILATERAL PULMONARY EMBOLISM: Status: RESOLVED | Noted: 2020-05-31 | Resolved: 2021-03-30

## 2021-03-30 PROBLEM — I82.402 LEG DVT (DEEP VENOUS THROMBOEMBOLISM), ACUTE, LEFT: Chronic | Status: RESOLVED | Noted: 2020-06-01 | Resolved: 2021-03-30

## 2021-03-30 LAB
DEPRECATED RDW RBC AUTO: 43.4 FL (ref 37–54)
ERYTHROCYTE [DISTWIDTH] IN BLOOD BY AUTOMATED COUNT: 13.3 % (ref 12.3–15.4)
HCT VFR BLD AUTO: 42.9 % (ref 37.5–51)
HGB BLD-MCNC: 14.9 G/DL (ref 13–17.7)
MCH RBC QN AUTO: 30.8 PG (ref 26.6–33)
MCHC RBC AUTO-ENTMCNC: 34.7 G/DL (ref 31.5–35.7)
MCV RBC AUTO: 88.6 FL (ref 79–97)
PLATELET # BLD AUTO: 228 10*3/MM3 (ref 140–450)
PMV BLD AUTO: 10 FL (ref 6–12)
RBC # BLD AUTO: 4.84 10*6/MM3 (ref 4.14–5.8)
WBC # BLD AUTO: 5.13 10*3/MM3 (ref 3.4–10.8)

## 2021-03-30 PROCEDURE — 85027 COMPLETE CBC AUTOMATED: CPT

## 2021-03-30 PROCEDURE — 36415 COLL VENOUS BLD VENIPUNCTURE: CPT

## 2021-03-30 PROCEDURE — 99213 OFFICE O/P EST LOW 20 MIN: CPT | Performed by: NURSE PRACTITIONER

## 2021-03-30 NOTE — PATIENT INSTRUCTIONS
Adverse bleeding events that require evaluation includes blood in the urine, stool, gums, and nose.  If you are to experience these symptoms you should present to the heart and vascular center for evaluation.  Avoid NSAID's such as ibuprofen and naproxen for pain relief as these medications increase your risk of gastrointestinal bleeding.  Over the counter supplements such as garlic, gingko biloba, and other herbs may increase bleeding risks. Do not stop your medication unless directed by a provider, take care in making sure you have adequate supply so that you are not without medication. If this medicine becomes unaffordable please contact heart and vascular center for evaluation before discontinuing.    Follow up with cardiology to re establish with Dr. Rmaos.

## 2021-03-30 NOTE — PROGRESS NOTES
CVTS Office Progress Note     3/30/2021    Baljinder Blanca  1955    Chief Complaint:    Chief Complaint   Patient presents with   • Deep Vein Thrombosis       HPI:      PCP:  Dr. Anderson    64 y.o. male with HTN(stable, increased risk stroke, rupture), Hyperlipidemia(stable, increased risk cardiovascular events), Obesity(uncontrolled, increased risk cardiovascular events) and Atrial fibrillation(stable, increased risk stroke)  SSS status post pacemaker, bilateral venous insuffiency (vein work in [place] both sides. never smoked.  FH of DVT/PE no confirmed genetic cause at this time.  Patient presented to [facility] on 5/31 with complaints of shortness of breath x4 days gradually worsening.  LLE swelling has been present for several weeks preceding admission.  Lower extremity duplex was positive for DVT in left SFV and distal posterior calf vessels.  CTA was obtained demonstrating bilateral lower lobe PE and CTVS Dr. Weathers was consulted for further recommendations.   No other associated symptoms or modifying factors.     2015 Left GSV ablation Dr. Elliott, followed in [place] [facility] clinic     10/2018 Echo: EF 60%, grade I DD, mild TR, RVDD 32mm  5/31/2020 Left LE Duplex: Acute DVT L SFV, Posterior calf vessels  5/31/2020 CTA Chest: Bilateral lower lobe pulmonary embolus. Renal calculi.   9/2020 LLE Venous: Negative for DVT, SFJ reflux, prior L GSV ablation.      6/1/2020 Echo: Pending at time of consultation, preliminary RVSP 32      The following portions of the patient's history were reviewed and updated as appropriate: allergies, current medications, past family history, past medical history, past social history, past surgical history and problem list.  Recent images independently reviewed.  Available laboratory values reviewed.    PMH:  Past Medical History:   Diagnosis Date   • Arrhythmia    • Atrial fibrillation (CMS/HCC)    • SSS (sick sinus syndrome) (CMS/HCC)      Past Surgical History:    Procedure Laterality Date   • GALLBLADDER SURGERY     • INSERT / REPLACE / REMOVE PACEMAKER     • SKIN LESION EXCISION     • VASECTOMY     • VEIN SURGERY       Family History   Problem Relation Age of Onset   • No Known Problems Mother    • No Known Problems Father      Social History     Tobacco Use   • Smoking status: Never Smoker   • Smokeless tobacco: Current User     Types: Snuff   Substance Use Topics   • Alcohol use: No   • Drug use: No       ALLERGIES:  No Known Allergies      MEDICATIONS:    Current Outpatient Medications:   •  aspirin 81 MG chewable tablet, Chew 1 tablet Daily., Disp: 30 tablet, Rfl: 1  •  dronedarone (Multaq) 400 MG tablet, Take 1 tablet by mouth 2 (Two) Times a Day With Meals., Disp: 180 tablet, Rfl: 3  •  Eliquis 5 MG tablet tablet, TAKE 1 TABLET BY MOUTH EVERY 12 HOURS, Disp: 60 tablet, Rfl: 2  •  Glucosamine-Chondroitin 250-200 MG tablet, Take  by mouth., Disp: , Rfl:   •  Multiple Vitamins-Minerals (MULTIVITAL PO), Take 1 tablet by mouth Daily., Disp: , Rfl:       Review of Systems   Constitutional: Negative for chills, decreased appetite, fever and weight loss.   HENT: Negative for congestion, nosebleeds and sore throat.    Eyes: Negative for blurred vision, visual disturbance and visual halos.   Cardiovascular: Negative for chest pain, dyspnea on exertion and leg swelling.   Respiratory: Negative for cough, shortness of breath, sputum production and wheezing.    Endocrine: Negative for cold intolerance and polyuria.   Hematologic/Lymphatic: Negative for bleeding problem. Bruises/bleeds easily.        Does not report S/S of adverse bleeding event including nose bleeds, hematuria, melena, gingival bleeding, or hematemesis.   Skin: Negative for flushing, nail changes and unusual hair distribution.   Musculoskeletal: Positive for arthritis, back pain and joint pain.   Gastrointestinal: Negative for bloating, abdominal pain, hematemesis, melena, nausea and vomiting.   Genitourinary:  "Negative for flank pain and hematuria.   Neurological: Negative for brief paralysis, difficulty with concentration, focal weakness, light-headedness, loss of balance, numbness, paresthesias and weakness.        No amaurosis fugax, TIA, or CVA.     Psychiatric/Behavioral: Negative for altered mental status, depression, substance abuse and suicidal ideas.   Allergic/Immunologic: Negative for hives and persistent infections.         Vitals:    03/30/21 1057   BP: 158/90   BP Location: Left arm   Patient Position: Sitting   Cuff Size: Adult   Pulse: 80   SpO2: 98%   Weight: 116 kg (256 lb)   Height: 182.9 cm (72\")     Physical Exam   Constitutional: He is oriented to person, place, and time. He appears well-developed.   HENT:   Head: Normocephalic and atraumatic.   Eyes: Pupils are equal, round, and reactive to light. Conjunctivae are normal.   Neck: No JVD present.   Cardiovascular: Normal rate, regular rhythm and normal heart sounds.   No murmur heard.  BLE distal signals triphasic.    Pulmonary/Chest: Effort normal and breath sounds normal. No respiratory distress.   Abdominal: Soft. Bowel sounds are normal. He exhibits no distension. There is no abdominal tenderness.   Musculoskeletal: Normal range of motion. No tenderness.   Lymphadenopathy:     He has no cervical adenopathy.   Neurological: He is alert and oriented to person, place, and time. No cranial nerve deficit. Coordination normal.   Skin: Skin is warm and dry. Capillary refill takes less than 2 seconds. He is not diaphoretic. No erythema.   There is no evidence of skin breakdown of the bilateral lower extremities.  BLE pink, no evidence of ischemia.  Feet are absent of dependent rubor.   Psychiatric: His behavior is normal. Judgment normal.   Nursing note and vitals reviewed.      Assessment & Plan     Independent Review of Studies    1. Paroxysmal atrial fibrillation (CMS/HCC)  Lost of follow up.  Re-establish with Dr. Ramos  Indication for " anticoagulation    - Ambulatory Referral to Cardiology    2. History of pulmonary embolism  Long term anticoagulation    3. History of DVT (deep vein thrombosis)  As above    4. Encounter for anticoagulation discussion and counseling  Adverse bleeding events that require evaluation includes blood in the urine, stool, gums, and nose.  If you are to experience these symptoms you should present to the heart and vascular center for evaluation.  Avoid NSAID's such as ibuprofen and naproxen for pain relief as these medications increase your risk of gastrointestinal bleeding.  Over the counter supplements such as garlic, gingko biloba, and other herbs may increase bleeding risks.     Copay affordable    Lab Results   Component Value Date    WBC 5.13 03/30/2021    HGB 14.9 03/30/2021    HCT 42.9 03/30/2021    MCV 88.6 03/30/2021     03/30/2021       Return one year CBC    Detailed discussion regarding risks, benefits, and treatment plan. Images independently reviewed. Patient understands, agrees, and wishes to proceed with plan.       This document has been electronically signed by PAOLA ShepardCNP-BC @  On March 30, 2021 12:53 CDT      EMR Dragon/Transcription disclaimer:   Much of this encounter note is an electronic transcription/translation of spoken language to printed text. The electronic translation of spoken language may permit erroneous, or at times, nonsensical words or phrases to be inadvertently transcribed; Although I have reviewed the note for such errors, some may still exist.

## 2021-04-16 ENCOUNTER — IMMUNIZATION (OUTPATIENT)
Dept: VACCINE CLINIC | Facility: HOSPITAL | Age: 66
End: 2021-04-16

## 2021-04-16 PROCEDURE — 91300 HC SARSCOV02 VAC 30MCG/0.3ML IM: CPT | Performed by: THORACIC SURGERY (CARDIOTHORACIC VASCULAR SURGERY)

## 2021-04-16 PROCEDURE — 0002A: CPT | Performed by: THORACIC SURGERY (CARDIOTHORACIC VASCULAR SURGERY)

## 2021-04-21 ENCOUNTER — OFFICE VISIT (OUTPATIENT)
Dept: CARDIOLOGY | Facility: CLINIC | Age: 66
End: 2021-04-21

## 2021-04-21 VITALS
BODY MASS INDEX: 35.21 KG/M2 | DIASTOLIC BLOOD PRESSURE: 68 MMHG | HEART RATE: 60 BPM | HEIGHT: 72 IN | SYSTOLIC BLOOD PRESSURE: 140 MMHG | OXYGEN SATURATION: 99 % | WEIGHT: 260 LBS

## 2021-04-21 DIAGNOSIS — R55 VASOVAGAL SYNCOPE: ICD-10-CM

## 2021-04-21 DIAGNOSIS — I48.0 PAROXYSMAL ATRIAL FIBRILLATION (HCC): Chronic | ICD-10-CM

## 2021-04-21 DIAGNOSIS — Z95.0 PACEMAKER: Primary | ICD-10-CM

## 2021-04-21 DIAGNOSIS — I49.5 SSS (SICK SINUS SYNDROME) (HCC): ICD-10-CM

## 2021-04-21 PROCEDURE — 93000 ELECTROCARDIOGRAM COMPLETE: CPT | Performed by: INTERNAL MEDICINE

## 2021-04-21 PROCEDURE — 99214 OFFICE O/P EST MOD 30 MIN: CPT | Performed by: INTERNAL MEDICINE

## 2021-04-21 NOTE — PROGRESS NOTES
Baljinder Blanca  65 y.o. male    4/21/2021  1. Pacemaker    2. Paroxysmal atrial fibrillation (CMS/HCC)    3. Vasovagal syncope    4. SSS (sick sinus syndrome) (CMS/HCC)        History of Present Illness:          Body mass index is 35.25 kg/m². BMI is above normal parameters. Recommendations include: exercise counseling, nutrition counseling and referral to primary care.      65 years old patient with history of sick sinus syndrome, paroxysmal atrial fibrillation, history of varicose veins status post ablation performed with Dr. Deny gómez,, and last year in May 2020 patient admitted to [facility] for evaluations of dyspnea with a positive lower extremity DVT and massive bilateral pulmonary embolism.  The patient on long-term oral anticoagulation and repeat venous Doppler no evidence of DVT.  Last echo ejection fraction 50% borderline dilated left atrium and right ventricle cavity was also borderline in size.  He is a pleased with the clinical outcome past medical history of sick sinus syndrome status post pacemaker implantation.  History of for near syncopal episode in upright postures got better with increasing water and salt intake.  . .   he denies orthopnea, PND, nauseous, vomiting, diarrhea.     Echo 6/1/2020    · Estimated EF = 50%.  · Left ventricular systolic function is normal.  · Left ventricular diastolic dysfunction (grade I) consistent with impaired relaxation.  · Left atrial cavity size is borderline dilated.  · Mild tricuspid valve regurgitation is present.  · Right ventricular cavity is borderline dilated.      CT pulmonary angiogram 5/31/2020  MPRESSION:     There is presence of filling defects in the bilateral main  pulmonary arteries, extending peripherally into the main  bilateral segmental and subsegmental branches of the pulmonary  arteries, suggestive of significant bilateral pulmonary embolism.     Note is again made of pathologically enlarged lymph nodes in the  AP window,,  right hilum, right paratracheal region and the  precarinal region, the largest of these lymph nodes seen in the  right paratracheal region measuring 15 mm in short axis  dimension.     Note is made of few subcentimeter nonobstructive calculi in the  partially visualized bilateral kidneys      Venous Doppler 5/31/2020    IMPRESSION:  Echogenic noncompressible thrombi distal left  superficial femoral vein above the knee and posterior calf veins.    ECHO 10/2018  ·  Left ventricular wall thickness is consistent with mild concentric hypertrophy.  · Mild tricuspid valve regurgitation is present.  · Left ventricular systolic function is normal. Estimated EF = 60%.  Left ventricular diastolic dysfunction (grade I a) consistent with impaired relaxation.       Lipid 12/8/2020    Total Cholesterol   <200 mg/dL 179    Triglycerides   30 - 150 mg/dL 125    HDL Cholesterol   32 - 72 mg/dL 47    LDL Cholesterol    5 - 99 mg/dL 113High     Chol/HDL Ratio   4.2 - 5.6 3.8Low             1/2016  Total Protein 6.3 - 8.6 gm/dl 7.8    Albumin 3.4 - 4.8 gm/dl 4.5    Bilirubin, Direct 0.0 - 0.3 mg/dl 0.0    Total Bilirubin 0.2 - 1.3 mg/dl 0.6    Alkaline Phosphatase 38 - 126 U/L 115    AST (SGOT) 17 - 59 U/L 33    ALT (SGPT) 21 - 72 U/L 26    Resulting Agency               SUBJECTIVE:    No Known Allergies      Past Medical History:   Diagnosis Date   • Arrhythmia    • Atrial fibrillation (CMS/HCC)    • SSS (sick sinus syndrome) (CMS/HCC)          Past Surgical History:   Procedure Laterality Date   • GALLBLADDER SURGERY     • INSERT / REPLACE / REMOVE PACEMAKER     • SKIN LESION EXCISION     • VASECTOMY     • VEIN SURGERY           Family History   Problem Relation Age of Onset   • No Known Problems Mother    • No Known Problems Father          Social History     Socioeconomic History   • Marital status:      Spouse name: Not on file   • Number of children: Not on file   • Years of education: Not on file   • Highest education  "level: Not on file   Tobacco Use   • Smoking status: Never Smoker   • Smokeless tobacco: Current User     Types: Snuff   Vaping Use   • Vaping Use: Never used   Substance and Sexual Activity   • Alcohol use: No   • Drug use: No   • Sexual activity: Defer         Current Outpatient Medications   Medication Sig Dispense Refill   • aspirin 81 MG chewable tablet Chew 1 tablet Daily. 30 tablet 1   • dronedarone (Multaq) 400 MG tablet Take 1 tablet by mouth 2 (Two) Times a Day With Meals. 180 tablet 3   • Eliquis 5 MG tablet tablet TAKE 1 TABLET BY MOUTH EVERY 12 HOURS 60 tablet 2     No current facility-administered medications for this visit.           Review of Systems:     Constitutional:  Denies recent weight loss, weight gain,no change in exercise tolerance.     HENT:  Denies any hearing loss, epistaxis, hoarseness    Eyes: No blurring    Respiratory: History of bilateral pulmonary embolism    Cardiovascular: H&P.     Gastrointestinal:  Denies change in bowel habits, dyspepsia, ulcer disease, hematochezia, or melena.     Endocrine: Negative for cold intolerance, heat intolerance, polydipsia, polyphagia and polyuria.         Musculoskeletal: Denies any history of arthritic symptoms or back problems.     Skin:  Denies any change in hair or nails, rashes, or skin lesions.     Allergic/Immunologic: Negative.  Negative for environmental allergies, food allergies    Neurological:  Denies any history of recurrent headaches, strokes    Hematological: Denies any food allergies, seasonal allergies    Psychiatric/Behavioral: Denies any history of depression      OBJECTIVE:    /68 (BP Location: Left arm, Patient Position: Sitting, Cuff Size: Adult)   Pulse 60   Ht 182.9 cm (72.01\")   Wt 118 kg (260 lb)   SpO2 99%   BMI 35.25 kg/m²       Physical Exam:     Constitutional no apparent distress    HEENT atraumatic, conjunctive is pink thyroid is nonpalpable no jugular is distention    Lung clear to auscultation no rales " or wheezing    Cardiovascular regular rate rhythm no S3 no pericardial rub    Abdominal soft nontender normal active bowel sounds    Extremities trace edema positive peripheral pulses.     Musculoskeletal no evidence of active inflammation    Neurological no gross focal abnormality    Skin no rash    Psychiatric good mood and good personality            Procedures      Lab Results   Component Value Date    WBC 5.13 03/30/2021    HGB 14.9 03/30/2021    HCT 42.9 03/30/2021    MCV 88.6 03/30/2021     03/30/2021     Lab Results   Component Value Date    GLUCOSE 120 (H) 06/02/2020    BUN 15 06/02/2020    CREATININE 1.02 06/02/2020    EGFRIFNONA 74 06/02/2020    BCR 14.7 06/02/2020    CO2 25.0 06/02/2020    CALCIUM 9.1 06/02/2020    ALBUMIN 3.90 05/31/2020    AST 28 05/31/2020    ALT 19 05/31/2020     Lab Results   Component Value Date    CHOL 179 12/08/2020    CHOL 193 06/11/2020     Lab Results   Component Value Date    TRIG 125 12/08/2020    TRIG 114 06/11/2020     Lab Results   Component Value Date    HDL 47 12/08/2020    HDL 50 06/11/2020     No components found for: LDLCALC  Lab Results   Component Value Date     (H) 12/08/2020     (H) 06/11/2020     No results found for: HDLLDLRATIO  No components found for: CHOLHDL  Lab Results   Component Value Date    HGBA1C 6.6 (H) 12/08/2020     Lab Results   Component Value Date    TSH 1.97 01/29/2016           ASSESSMENT AND PLAN:  #1sick sinus syndrome status post pacemaker implantation     Previous pacemaker interrogation good sensing pacing threshold.  He  follow-up with the pacemaker clinic no further recurrence of syncope    #2 paroxysmal atrial fibrillation continued sinus rhythm.  Continue Multaq  [id number] score is 2  Given the history of DVT and massive bilateral pulmonary embolism we will continue oral anticoagulation  #3 hypertension manage the risk factor lifestyle modification  Preventive    Significantly low carbohydrate, low-fat, DASH  diet graded exercise discussed.      Diagnoses and all orders for this visit:    1. Pacemaker (Primary)  -     ECG 12 Lead    2. Paroxysmal atrial fibrillation (CMS/HCC)    3. Vasovagal syncope    4. SSS (sick sinus syndrome) (CMS/HCC)          Gautam Ramos MD  4/21/2021  08:48 CDT

## 2021-05-05 LAB
QT INTERVAL: 460 MS
QTC INTERVAL: 460 MS

## 2021-05-26 ENCOUNTER — CLINICAL SUPPORT (OUTPATIENT)
Dept: CARDIOLOGY | Facility: CLINIC | Age: 66
End: 2021-05-26

## 2021-05-26 DIAGNOSIS — Z95.0 PACEMAKER: ICD-10-CM

## 2021-05-26 DIAGNOSIS — I49.5 SSS (SICK SINUS SYNDROME) (HCC): Primary | ICD-10-CM

## 2021-05-26 PROCEDURE — 93288 INTERROG EVL PM/LDLS PM IP: CPT | Performed by: NURSE PRACTITIONER

## 2021-05-26 NOTE — PROGRESS NOTES
Pacemaker Evaluation Report    May 26, 2021    Primary Cardiologist: Dr Ramos  Implanting MD: Dr. Ramos  : [employer]  Model: [employer] DR RF 2210  Serial Number: [id number]  Implant date: 8/2/2011    Reason for evaluation:routine PPM Office  Cardiac device indication(s): sick sinus syndrome    Battery  HUMA: 1.3 years      Interrogation Results  Ventricular sensing: R wave: 11.4 mV  Ventricular capture: 1.25 V @ 0.5 ms  Ventricular lead impedance: right  440 ohms    Parameters  Mode: VVIR  Base Rate: 60    Diagnostic Data  Ventricular paced: 41 %  Mode switch: 0%  AT/AF [place]: 0%  AHR: 0  VHR: 2 longest 6 sec    Intrinsic Rate: 86    Changes made:  No changes     Conclusions: normal device function    Assessment:  1. SSS (sick sinus syndrome) (CMS/HCC)    2. Pacemaker            This document has been electronically signed by MIGUE Curtis on May 26, 2021 12:36 CDT

## 2021-06-15 RX ORDER — APIXABAN 5 MG/1
TABLET, FILM COATED ORAL
Qty: 60 TABLET | Refills: 2 | Status: SHIPPED | OUTPATIENT
Start: 2021-06-15 | End: 2021-08-11

## 2021-08-02 DIAGNOSIS — I48.0 PAROXYSMAL A-FIB (HCC): ICD-10-CM

## 2021-08-11 RX ORDER — APIXABAN 5 MG/1
TABLET, FILM COATED ORAL
Qty: 60 TABLET | Refills: 2 | Status: SHIPPED | OUTPATIENT
Start: 2021-08-11 | End: 2021-09-30

## 2021-08-26 PROCEDURE — 93296 REM INTERROG EVL PM/IDS: CPT | Performed by: NURSE PRACTITIONER

## 2021-08-26 PROCEDURE — 93294 REM INTERROG EVL PM/LDLS PM: CPT | Performed by: NURSE PRACTITIONER

## 2021-09-30 RX ORDER — APIXABAN 5 MG/1
TABLET, FILM COATED ORAL
Qty: 60 TABLET | Refills: 2 | Status: SHIPPED | OUTPATIENT
Start: 2021-09-30 | End: 2022-01-05

## 2021-11-25 PROCEDURE — 93296 REM INTERROG EVL PM/IDS: CPT | Performed by: NURSE PRACTITIONER

## 2021-11-25 PROCEDURE — 93294 REM INTERROG EVL PM/LDLS PM: CPT | Performed by: NURSE PRACTITIONER

## 2021-12-08 ENCOUNTER — OFFICE VISIT (OUTPATIENT)
Dept: CARDIOLOGY | Facility: CLINIC | Age: 66
End: 2021-12-08

## 2021-12-08 ENCOUNTER — CLINICAL SUPPORT (OUTPATIENT)
Dept: CARDIOLOGY | Facility: CLINIC | Age: 66
End: 2021-12-08

## 2021-12-08 VITALS
SYSTOLIC BLOOD PRESSURE: 130 MMHG | HEIGHT: 72 IN | WEIGHT: 244 LBS | BODY MASS INDEX: 33.05 KG/M2 | OXYGEN SATURATION: 96 % | DIASTOLIC BLOOD PRESSURE: 82 MMHG | HEART RATE: 78 BPM

## 2021-12-08 DIAGNOSIS — I36.1 NON-RHEUMATIC TRICUSPID VALVE INSUFFICIENCY: ICD-10-CM

## 2021-12-08 DIAGNOSIS — I48.0 PAROXYSMAL ATRIAL FIBRILLATION (HCC): Primary | Chronic | ICD-10-CM

## 2021-12-08 DIAGNOSIS — Z95.0 PACEMAKER: ICD-10-CM

## 2021-12-08 DIAGNOSIS — I49.5 SSS (SICK SINUS SYNDROME) (HCC): Primary | ICD-10-CM

## 2021-12-08 DIAGNOSIS — I49.5 SSS (SICK SINUS SYNDROME) (HCC): ICD-10-CM

## 2021-12-08 PROCEDURE — 99213 OFFICE O/P EST LOW 20 MIN: CPT | Performed by: INTERNAL MEDICINE

## 2021-12-08 NOTE — PROGRESS NOTES
Baljinder Blanca  66 y.o. male    12/8/2021  1. Paroxysmal atrial fibrillation (HCC)    2. Non-rheumatic tricuspid valve insufficiency    3. SSS (sick sinus syndrome) (HCC)        History of Present Illness:          Body mass index is 33.09 kg/m². BMI is above normal parameters. Recommendations include: exercise counseling, nutrition counseling and referral to primary care.    66 years old patient who presented for routine follow-up with a background history of sick sinus syndrome status post pacemaker recent interrogation revealed a pacemaker good for another 6 to 7-month with history of pulmonary embolism varicose veins s/p ablations and repeat Dopplers no evidence of DVT.  To previous echo her ejection for the range of 50% with mild tricuspid regurgitation borderline right ventricular size.  No symptoms of cardiac decompensation such orthopnea PND chest pain intermittent claudication dyspnea reported by the patient.  Patient does have history of postural dizziness responded well to water intake    Echo 6/1/2020    · Estimated EF = 50%.  · Left ventricular systolic function is normal.  · Left ventricular diastolic dysfunction (grade I) consistent with impaired relaxation.  · Left atrial cavity size is borderline dilated.  · Mild tricuspid valve regurgitation is present.  · Right ventricular cavity is borderline dilated.      CT pulmonary angiogram 5/31/2020  MPRESSION:     There is presence of filling defects in the bilateral main  pulmonary arteries, extending peripherally into the main  bilateral segmental and subsegmental branches of the pulmonary  arteries, suggestive of significant bilateral pulmonary embolism.     Note is again made of pathologically enlarged lymph nodes in the  AP window,, right hilum, right paratracheal region and the  precarinal region, the largest of these lymph nodes seen in the  right paratracheal region measuring 15 mm in short axis  dimension.     Note is made of few subcentimeter  nonobstructive calculi in the  partially visualized bilateral kidneys      Venous Doppler 5/31/2020    IMPRESSION:  Echogenic noncompressible thrombi distal left  superficial femoral vein above the knee and posterior calf veins.    ECHO 10/2018  ·  Left ventricular wall thickness is consistent with mild concentric hypertrophy.  · Mild tricuspid valve regurgitation is present.  · Left ventricular systolic function is normal. Estimated EF = 60%.  Left ventricular diastolic dysfunction (grade I a) consistent with impaired relaxation.       Lipid 12/8/2020    Total Cholesterol   <200 mg/dL 179    Triglycerides   30 - 150 mg/dL 125    HDL Cholesterol   32 - 72 mg/dL 47    LDL Cholesterol    5 - 99 mg/dL 113High     Chol/HDL Ratio   4.2 - 5.6 3.8Low             1/2016  Total Protein 6.3 - 8.6 gm/dl 7.8    Albumin 3.4 - 4.8 gm/dl 4.5    Bilirubin, Direct 0.0 - 0.3 mg/dl 0.0    Total Bilirubin 0.2 - 1.3 mg/dl 0.6    Alkaline Phosphatase 38 - 126 U/L 115    AST (SGOT) 17 - 59 U/L 33    ALT (SGPT) 21 - 72 U/L 26    Resulting Agency               SUBJECTIVE:    No Known Allergies      Past Medical History:   Diagnosis Date   • Arrhythmia    • Atrial fibrillation (HCC)    • SSS (sick sinus syndrome) (HCC)          Past Surgical History:   Procedure Laterality Date   • GALLBLADDER SURGERY     • INSERT / REPLACE / REMOVE PACEMAKER     • SKIN LESION EXCISION     • VASECTOMY     • VEIN SURGERY           Family History   Problem Relation Age of Onset   • No Known Problems Mother    • No Known Problems Father          Social History     Socioeconomic History   • Marital status:    Tobacco Use   • Smoking status: Never Smoker   • Smokeless tobacco: Former User     Types: Snuff   Vaping Use   • Vaping Use: Never used   Substance and Sexual Activity   • Alcohol use: No   • Drug use: No   • Sexual activity: Defer         Current Outpatient Medications   Medication Sig Dispense Refill   • aspirin 81 MG chewable tablet Chew 1 tablet  "Daily. 30 tablet 1   • dronedarone (Multaq) 400 MG tablet Take 1 tablet by mouth 2 (Two) Times a Day With Meals. 180 tablet 3   • Eliquis 5 MG tablet tablet TAKE 1 TABLET BY MOUTH EVERY 12 HOURS 60 tablet 2   • metFORMIN (GLUCOPHAGE) 500 MG tablet        No current facility-administered medications for this visit.           Review of Systems:     Constitutional:  Denies recent weight loss, weight gain,no change in exercise tolerance.     HENT:  Denies any hearing loss, epistaxis, hoarseness    Eyes: No blurring    Respiratory: History of bilateral pulmonary embolism    Cardiovascular: H&P.     Gastrointestinal:  Denies change in bowel habits, dyspepsia, ulcer disease, hematochezia, or melena.     Endocrine: Negative for cold intolerance, heat intolerance, polydipsia, polyphagia and polyuria.         Musculoskeletal: Denies any history of arthritic symptoms or back problems.     Skin:  Denies any change in hair or nails, rashes, or skin lesions.     Allergic/Immunologic: Negative.  Negative for environmental allergies, food allergies    Neurological:  Denies any history of recurrent headaches, strokes    Hematological: Denies any food allergies, seasonal allergies    Psychiatric/Behavioral: Denies any history of depression      OBJECTIVE:    /82 (BP Location: Left arm, Patient Position: Sitting, Cuff Size: Adult)   Pulse 78   Ht 182.9 cm (72\")   Wt 111 kg (244 lb)   SpO2 96%   BMI 33.09 kg/m²       Physical Exam:     Constitutional no apparent distress    HEENT atraumatic, conjunctive is pink thyroid is nonpalpable no jugular is distention    Lung clear to auscultation no rales or wheezing    Cardiovascular regular rate rhythm no S3 no pericardial rub    Abdominal soft nontender normal active bowel sounds    Extremities trace edema positive peripheral pulses.     Musculoskeletal no evidence of active inflammation    Neurological no gross focal abnormality    Skin no rash    Psychiatric good mood and good " personality            Procedures      Lab Results   Component Value Date    WBC 5.13 03/30/2021    HGB 14.9 03/30/2021    HCT 42.9 03/30/2021    MCV 88.6 03/30/2021     03/30/2021     Lab Results   Component Value Date    GLUCOSE 120 (H) 06/02/2020    BUN 11 10/07/2021    CREATININE 1.0 10/07/2021    EGFRIFNONA 74 06/02/2020    EGFRIFAFRI 90 10/07/2021    BCR 14.7 06/02/2020    CO2 27 10/07/2021    CALCIUM 9.1 10/07/2021    ALBUMIN 3.9 10/07/2021    AST 22 10/07/2021    ALT 16 10/07/2021     Lab Results   Component Value Date    CHOL 179 12/08/2020    CHOL 193 06/11/2020     Lab Results   Component Value Date    TRIG 103 07/12/2021    TRIG 125 12/08/2020    TRIG 114 06/11/2020     Lab Results   Component Value Date    HDL 64 07/12/2021    HDL 47 12/08/2020    HDL 50 06/11/2020     No components found for: LDLCALC  Lab Results   Component Value Date     07/12/2021     (H) 12/08/2020     (H) 06/11/2020     No results found for: HDLLDLRATIO  No components found for: CHOLHDL  Lab Results   Component Value Date    HGBA1C 6.6 (H) 10/07/2021     Lab Results   Component Value Date    TSH 1.97 01/29/2016           ASSESSMENT AND PLAN:  #1sick sinus syndrome status post pacemaker implantation we will make arrangement to have pacemaker interrogated today        #2 paroxysmal atrial fibrillation continued sinus rhythm.  Continue Multaq sample was given to the patient    [id number] score is 2    Given the history of DVT and massive bilateral pulmonary embolism we will continue oral anticoagulation    #3 hypertension manage the risk factor lifestyle modification  Preventive    Preventivem obesity with a BMI of 33 with history of paroxysmal atrial fibrillation's and DVT and diabetes    Significantly low carbohydrate, low-fat, DASH diet graded exercise discussed.    I spent 20 minutes caring for Baljinder on this date of service. This time includes time spent by me includes counseling/coordination of care  as relates to the presenting problem and any ordered procedures/tests as outlined above.     Diagnoses and all orders for this visit:    1. Paroxysmal atrial fibrillation (HCC) (Primary)    2. Non-rheumatic tricuspid valve insufficiency    3. SSS (sick sinus syndrome) (HCC)          Gautam Ramos MD  12/8/2021  08:30 CST

## 2021-12-08 NOTE — PROGRESS NOTES
Pacemaker Evaluation Report    December 9, 2021    Primary Cardiologist: Dr Ramos  Implanting MD: Dr. Ramos  : [employer]  Model: [employer]ent DR RF 2210  Serial Number: [id number]  Implant date: 8/2/2011    Reason for evaluation:routine PPM Office  Cardiac device indication(s): sick sinus syndrome    Battery  HUMA: 4.4 months      Interrogation Results  Ventricular sensing: R wave: 10.0 mV  Ventricular capture: 1.0 V @ 0.5 ms  Ventricular lead impedance: right  440 ohms    Parameters  Mode: VVIR  Base Rate: 60    Diagnostic Data  Ventricular paced: 32 %  Mode switch: 0%  AT/AF [place]: 0%  AHR: 0  VHR: 0    Intrinsic Rate: 65    Changes made:  No changes     Conclusions: normal device function. Will remote monthly r/t HUMA.    Assessment:  1. SSS (sick sinus syndrome) (HCC)    2. Pacemaker            This document has been electronically signed by MIGUE Curtis on December 9, 2021 09:49 CST

## 2021-12-09 PROCEDURE — 93288 INTERROG EVL PM/LDLS PM IP: CPT | Performed by: NURSE PRACTITIONER

## 2021-12-15 NOTE — PROGRESS NOTES
Baljinder Blanca  64 y.o. male    5/6/2020  1. Paroxysmal A-fib (CMS/[facility])    2. Vasovagal syncope    3. Non-rheumatic tricuspid valve insufficiency    4. SSS (sick sinus syndrome) (CMS/[facility])        History of Present Illness:      This visit has been rescheduled as a phone visit to comply with patient safety concerns in accordance with CDC recommendations. Total time of discussion was 15 minutes.    You have chosen to receive care through a telephone visit. Do you consent to use a telephone visit for your medical care today? Yes    Body mass index is 31.87 kg/m². BMI is above normal parameters. Recommendations include: exercise counseling, nutrition counseling and referral to primary care.      64 years old patient presented for telephone office visit and in the process of retiring a little worried about Multaq told if there is financial issues  please let us know we might change to flecainide 50 mg twice a day with   past medical history of sick sinus syndrome status post pacemaker implantation.  History of for near syncopal episode in upright postures got better with increasing water and salt intake.  .  He had history of varicose vein, ablation by  Dr. Deny Freitas had some trouble and evaluated in vein clinic [place] and ablation performed with a significant improvement in leg complaint.   he denies orthopnea, PND, nauseous, vomiting, diarrhea.  Echo findings discussed with the patient good heart function.  Pacemaker interrogated in March 2019 good sensing and pacing threshold.  No arrhythmia noted     ECHO 10/2018  ·  Left ventricular wall thickness is consistent with mild concentric hypertrophy.  · Mild tricuspid valve regurgitation is present.  · Left ventricular systolic function is normal. Estimated EF = 60%.  Left ventricular diastolic dysfunction (grade I a) consistent with impaired relaxation.     1/2016  Total Protein 6.3 - 8.6 gm/dl 7.8    Albumin 3.4 - 4.8 gm/dl 4.5    Bilirubin, Direct 0.0 - 0.3  Bronchoscopy     Procedure:        Bronchoscopy, BAL and ASPIRATION OF SECRETIONS  Indications:       infiltrates/atelectasis/PNA  Complications:  No immediate complications  Anesthesia      : Per anesthesia report     Procedure:  After obtaining informed consent, the bronchoscope was introduced through the right nostril,and advanced to the tracheobronchial tree of both lungs. The procedure was accomplished without difficulty. The patient tolerated the procedure well.     Findings:  1. Airway inspection    The distal end of the trachea appeared normal, deepak normal bifurcating in the right and left mainstem bronchi     Left Lung --> there was moderate amount of purulent secretions involving the left lower lobe and to a lesser extent the lingula which were suctioned  The tracheobronchial tree was examined to at least the first subsegmental level. Bronchial mucosa and anatomy are normal; there are no endobronchial lesions, and no secretions.                           Right Lung --> Then The tracheobronchial tree was examined to at least the first subsegmental level. Bronchial mucosa and anatomy are normal; there are no endobronchial lesions.     2. BAL from the RLL  The Bronchoscope was wedged in the LLL 60 ml of sterile saline was injected in and about 15 ml of CLEAR fluid was aspirated back        Impression: - Bronchoscopy with aspiration of secretions                         - BAL from the LLL    Media Information                mg/dl 0.0    Total Bilirubin 0.2 - 1.3 mg/dl 0.6    Alkaline Phosphatase 38 - 126 U/L 115    AST (SGOT) 17 - 59 U/L 33    ALT (SGPT) 21 - 72 U/L 26    Resulting Agency               SUBJECTIVE:    No Known Allergies      Past Medical History:   Diagnosis Date   • Arrhythmia    • Atrial fibrillation (CMS/HCC)    • SSS (sick sinus syndrome) (CMS/HCC)          Past Surgical History:   Procedure Laterality Date   • GALLBLADDER SURGERY     • INSERT / REPLACE / REMOVE PACEMAKER     • SKIN LESION EXCISION     • VASECTOMY     • VEIN SURGERY           Family History   Problem Relation Age of Onset   • No Known Problems Mother    • No Known Problems Father          Social History     Socioeconomic History   • Marital status:      Spouse name: Not on file   • Number of children: Not on file   • Years of education: Not on file   • Highest education level: Not on file   Tobacco Use   • Smoking status: Never Smoker   • Smokeless tobacco: Current User     Types: Snuff   Substance and Sexual Activity   • Alcohol use: No   • Drug use: No   • Sexual activity: Defer         Current Outpatient Medications   Medication Sig Dispense Refill   • aspirin 325 MG tablet Take 325 mg by mouth Daily.     • dronedarone (MULTAQ) 400 MG tablet Take 1 tablet by mouth 2 (Two) Times a Day With Meals. 180 tablet 3   • Multiple Vitamins-Minerals (MULTIVITAL PO) Take 1 tablet by mouth Daily.       No current facility-administered medications for this visit.            Review of Systems:     Constitutional:  Denies recent weight loss, weight gain, fever or chills, no change in exercise tolerance.     HENT:  Denies any hearing loss, epistaxis, hoarseness, or difficulty speaking.     Eyes: No blurring    Respiratory:  Denies dyspnea with exertion,no cough, wheezing, or hemoptysis.     Cardiovascular: Negative for palpations, chest pain, orthopnea, PND, peripheral edema, syncope, or claudication.     Gastrointestinal:  Denies change in bowel habits,  "dyspepsia, ulcer disease, hematochezia, or melena.     Endocrine: Negative for cold intolerance, heat intolerance, polydipsia, polyphagia and polyuria. Denies any history of weight change, polydipsia, polyuria.         Musculoskeletal: Denies any history of arthritic symptoms or back problems.     Skin:  Denies any change in hair or nails, rashes, or skin lesions.     Allergic/Immunologic: Negative.  Negative for environmental allergies, food allergies and immunocompromised state.     Neurological:  Denies any history of recurrent headaches, strokes, TIA, or seizure disorder.     Hematological: Denies any food allergies, seasonal allergies, bleeding disorders, or lymphadenopathy.     Psychiatric/Behavioral: Denies any history of depression, substance abuse, or change in cognitive function.       OBJECTIVE:    /89   Pulse 60   Ht 182.9 cm (72\")   Wt 107 kg (235 lb)   BMI 31.87 kg/m²       Physical Exam:     No physical exam due to telephone office visit.         Procedures      No results found for: WBC, HGB, HCT, MCV, PLT  Lab Results   Component Value Date    ALBUMIN 4.5 01/29/2016    AST 33 01/29/2016    ALT 26 01/29/2016     No results found for: CHOL  No results found for: TRIG  No results found for: HDL  No components found for: LDLCALC  No results found for: LDL  No results found for: HDLLDLRATIO  No components found for: CHOLHDL  No results found for: HGBA1C  Lab Results   Component Value Date    TSH 1.97 01/29/2016           ASSESSMENT AND PLAN:  #1sick sinus syndrome status post pacemaker implantation   #2 paroxysmal atrial fibrillation continued sinus rhythm.     Presented for telephone office visit seem to be from clinical discussion over the phone hemodynamically stable and no evidence of congestive heart failure no evidence of active ischemia.  Dizziness got significant better  after salt and water intake.  He will continue his present medication Multaq to decrease the recurrence of paroxysmal " atrial fibrillation .  No change was made in the medical management as his cardiac status seemed to well compensated.   continue aspirin given the history of paroxysmal atrial fibrillation given low [id number] vas Scoring.  Continue Multaq for management of paroxysmal atrial fibrillation     Baljinder was seen today for follow-up.    Diagnoses and all orders for this visit:    Paroxysmal A-fib (CMS/HCC)    Vasovagal syncope    Non-rheumatic tricuspid valve insufficiency    SSS (sick sinus syndrome) (CMS/[facility])          Gautam Ramos MD  5/6/2020  09:45

## 2022-01-05 RX ORDER — APIXABAN 5 MG/1
TABLET, FILM COATED ORAL
Qty: 60 TABLET | Refills: 2 | Status: SHIPPED | OUTPATIENT
Start: 2022-01-05 | End: 2022-03-26

## 2022-02-18 ENCOUNTER — OFFICE VISIT (OUTPATIENT)
Dept: SURGERY | Facility: CLINIC | Age: 67
End: 2022-02-18

## 2022-02-18 VITALS
HEART RATE: 68 BPM | HEIGHT: 72 IN | WEIGHT: 248.2 LBS | TEMPERATURE: 97.5 F | DIASTOLIC BLOOD PRESSURE: 84 MMHG | BODY MASS INDEX: 33.62 KG/M2 | SYSTOLIC BLOOD PRESSURE: 130 MMHG

## 2022-02-18 DIAGNOSIS — M62.08 DIASTASIS RECTI: Primary | ICD-10-CM

## 2022-02-18 PROCEDURE — 99203 OFFICE O/P NEW LOW 30 MIN: CPT | Performed by: SURGERY

## 2022-02-18 NOTE — PATIENT INSTRUCTIONS
"BMI for Adults  What is BMI?  Body mass index (BMI) is a number that is calculated from a person's weight and height. BMI can help estimate how much of a person's weight is composed of fat. BMI does not measure body fat directly. Rather, it is an alternative to procedures that directly measure body fat, which can be difficult and expensive.  BMI can help identify people who may be at higher risk for certain medical problems.  What are BMI measurements used for?  BMI is used as a screening tool to identify possible weight problems. It helps determine whether a person is obese, overweight, a healthy weight, or underweight.  BMI is useful for:  · Identifying a weight problem that may be related to a medical condition or may increase the risk for medical problems.  · Promoting changes, such as changes in diet and exercise, to help reach a healthy weight. BMI screening can be repeated to see if these changes are working.  How is BMI calculated?  BMI involves measuring your weight in relation to your height. Both height and weight are measured, and the BMI is calculated from those numbers. This can be done either in English (U[place] or metric measurements. Note that charts and online BMI calculators are available to help you find your BMI quickly and easily without having to do these calculations yourself.  To calculate your BMI in English ([place] measurements:    1. Measure your weight in pounds (lb).  2. Multiply the number of pounds by 703.  ? For example, for a person who weighs 180 lb, multiply that number by 703, which equals 126,540.  3. Measure your height in inches. Then multiply that number by itself to get a measurement called \"inches squared.\"  ? For example, for a person who is 70 inches tall, the \"inches squared\" measurement is 70 inches x 70 inches, which equals 4,900 inches squared.  4. Divide the total from step 2 (number of lb x 703) by the total from step 3 (inches squared): 126,540 ÷ 4,900 = 25.8. This is " "your BMI.    To calculate your BMI in metric measurements:  1. Measure your weight in kilograms (kg).  2. Measure your height in meters (m). Then multiply that number by itself to get a measurement called \"meters squared.\"  ? For example, for a person who is 1.75 m tall, the \"meters squared\" measurement is 1.75 m x 1.75 m, which is equal to 3.1 meters squared.  3. Divide the number of kilograms (your weight) by the meters squared number. In this example: 70 ÷ 3.1 = 22.6. This is your BMI.  What do the results mean?  BMI charts are used to identify whether you are underweight, normal weight, overweight, or obese. The following guidelines will be used:  · Underweight: BMI less than 18.5.  · Normal weight: BMI between 18.5 and 24.9.  · Overweight: BMI between 25 and 29.9.  · Obese: BMI of 30 or above.  Keep these notes in mind:  · Weight includes both fat and muscle, so someone with a muscular build, such as an athlete, may have a BMI that is higher than 24.9. In cases like these, BMI is not an accurate measure of body fat.  · To determine if excess body fat is the cause of a BMI of 25 or higher, further assessments may need to be done by a health care provider.  · BMI is usually interpreted in the same way for men and women.  Where to find more information  For more information about BMI, including tools to quickly calculate your BMI, go to these websites:  · Centers for Disease Control and Prevention: www.cdc.gov  · American Heart Association: www.heart.org  · National Heart, Lung, and Blood [place]: www.nhlbi.nih.gov  Summary  · Body mass index (BMI) is a number that is calculated from a person's weight and height.  · BMI may help estimate how much of a person's weight is composed of fat. BMI can help identify those who may be at higher risk for certain medical problems.  · BMI can be measured using English measurements or metric measurements.  · BMI charts are used to identify whether you are underweight, normal " weight, overweight, or obese.  This information is not intended to replace advice given to you by your health care provider. Make sure you discuss any questions you have with your health care provider.  Document Revised: 09/09/2020 Document Reviewed: 07/17/2020  Elsevier Patient Education © 2021 [employer]

## 2022-02-18 NOTE — PROGRESS NOTES
Chief Complaint   Patient presents with   • Consult     Consult for Possible Hernia        HPI  66 year old seen for a possible abdominal wall hernia. No hx of incarceration or obstruction.    Past Medical History:   Diagnosis Date   • Arrhythmia    • Atrial fibrillation (HCC)    • SSS (sick sinus syndrome) (HCC)        Past Surgical History:   Procedure Laterality Date   • GALLBLADDER SURGERY     • INSERT / REPLACE / REMOVE PACEMAKER     • SKIN LESION EXCISION     • VASECTOMY     • VEIN SURGERY           Current Outpatient Medications:   •  aspirin 81 MG chewable tablet, Chew 1 tablet Daily., Disp: 30 tablet, Rfl: 1  •  dronedarone (Multaq) 400 MG tablet, Take 1 tablet by mouth 2 (Two) Times a Day With Meals., Disp: 180 tablet, Rfl: 3  •  Eliquis 5 MG tablet tablet, TAKE 1 TABLET BY MOUTH EVERY 12 HOURS, Disp: 60 tablet, Rfl: 2  •  metFORMIN (GLUCOPHAGE) 500 MG tablet, , Disp: , Rfl:     No Known Allergies    Family History   Problem Relation Age of Onset   • Diabetes Mother    • Stroke Mother    • Stroke Father        Social History     Socioeconomic History   • Marital status:    Tobacco Use   • Smoking status: Light Tobacco Smoker     Types: Cigars   • Smokeless tobacco: Former User     Types: Snuff   Vaping Use   • Vaping Use: Never used   Substance and Sexual Activity   • Alcohol use: No   • Drug use: No   • Sexual activity: Defer       Review of Systems   Constitutional: Negative.    HENT: Positive for hearing loss.    Eyes: Negative.    Respiratory: Negative.    Cardiovascular: Negative.    Gastrointestinal: Negative.    Endocrine: Negative.    Genitourinary:        Nighttime urination.   Musculoskeletal: Negative.    Skin: Negative.    Allergic/Immunologic: Negative.    Neurological: Negative.    Hematological: Bruises/bleeds easily.   Psychiatric/Behavioral: Negative.        Physical Exam  Abdominal:      General: Abdomen is flat. There is no distension.      Palpations: Abdomen is soft. There is no  mass.      Tenderness: There is no abdominal tenderness. There is no guarding or rebound.      Hernia: No hernia (diastasis recti of the midline abdomen) is present.           ASSESSMENT    Diagnoses and all orders for this visit:    1. Diastasis recti (Primary)        PLAN    1. No repair necessary  2. No restriction of activity  3. Recheck as needed              This document has been electronically signed by Saurav Tinajero MD on February 18, 2022 09:30 CST

## 2022-03-26 RX ORDER — APIXABAN 5 MG/1
TABLET, FILM COATED ORAL
Qty: 60 TABLET | Refills: 2 | Status: SHIPPED | OUTPATIENT
Start: 2022-03-26 | End: 2022-03-31 | Stop reason: SDUPTHER

## 2022-03-31 ENCOUNTER — OFFICE VISIT (OUTPATIENT)
Dept: CARDIAC SURGERY | Facility: CLINIC | Age: 67
End: 2022-03-31

## 2022-03-31 VITALS
HEIGHT: 72 IN | HEART RATE: 67 BPM | TEMPERATURE: 97.7 F | SYSTOLIC BLOOD PRESSURE: 118 MMHG | BODY MASS INDEX: 34.7 KG/M2 | WEIGHT: 256.2 LBS | OXYGEN SATURATION: 97 % | DIASTOLIC BLOOD PRESSURE: 74 MMHG

## 2022-03-31 DIAGNOSIS — I48.0 PAROXYSMAL ATRIAL FIBRILLATION: Primary | Chronic | ICD-10-CM

## 2022-03-31 DIAGNOSIS — Z71.89 ENCOUNTER FOR ANTICOAGULATION DISCUSSION AND COUNSELING: ICD-10-CM

## 2022-03-31 PROCEDURE — 99213 OFFICE O/P EST LOW 20 MIN: CPT | Performed by: NURSE PRACTITIONER

## 2022-04-01 NOTE — PROGRESS NOTES
CVTS Office Progress Note     4/1/2022    Baljinder Blanca  1955    Chief Complaint:    Chief Complaint   Patient presents with   • Deep Vein Thrombosis       HPI:      PCP:  Dr. Anderson    64 y.o. male with HTN(stable, increased risk stroke, rupture), Hyperlipidemia(stable, increased risk cardiovascular events), Obesity(uncontrolled, increased risk cardiovascular events) and Atrial fibrillation(stable, increased risk stroke)  SSS status post pacemaker, bilateral venous insuffiency (vein work in [place] both sides. never smoked.  FH of DVT/PE no confirmed genetic cause at this time.  Patient presented to [facility] on 5/31 with complaints of shortness of breath x4 days gradually worsening.  LLE swelling has been present for several weeks preceding admission.  Lower extremity duplex was positive for DVT in left SFV and distal posterior calf vessels.  CTA was obtained demonstrating bilateral lower lobe PE and CTVS Dr. Weathers was consulted for further recommendations.  Follows for anticoagulation follow up, denies adverse bleeding, tolerating well. No other associated symptoms or modifying factors.     2015 Left GSV ablation Dr. Elliott, followed in [place] vein clinic     10/2018 Echo: EF 60%, grade I DD, mild TR, RVDD 32mm  5/31/2020 Left LE Duplex: Acute DVT L SFV, Posterior calf vessels  5/31/2020 CTA Chest: Bilateral lower lobe pulmonary embolus. Renal calculi.   9/2020 LLE Venous: Negative for DVT, SFJ reflux, prior L GSV ablation.      6/1/2020 Echo: Pending at time of consultation, preliminary RVSP 32      The following portions of the patient's history were reviewed and updated as appropriate: allergies, current medications, past family history, past medical history, past social history, past surgical history and problem list.  Recent images independently reviewed.  Available laboratory values reviewed.    PMH:  Past Medical History:   Diagnosis Date   • Arrhythmia    • Atrial fibrillation (HCC)     • SSS (sick sinus syndrome) (HCC)      Past Surgical History:   Procedure Laterality Date   • GALLBLADDER SURGERY     • INSERT / REPLACE / REMOVE PACEMAKER     • SKIN LESION EXCISION     • VASECTOMY     • VEIN SURGERY       Family History   Problem Relation Age of Onset   • Diabetes Mother    • Stroke Mother    • Stroke Father      Social History     Tobacco Use   • Smoking status: Light Tobacco Smoker     Types: Cigars   • Smokeless tobacco: Former User     Types: Snuff   Vaping Use   • Vaping Use: Never used   Substance Use Topics   • Alcohol use: No   • Drug use: No       ALLERGIES:  No Known Allergies      MEDICATIONS:    Current Outpatient Medications:   •  apixaban (Eliquis) 5 MG tablet tablet, Take 1 tablet by mouth Every 12 (Twelve) Hours., Disp: 60 tablet, Rfl: 11  •  aspirin 81 MG chewable tablet, Chew 1 tablet Daily., Disp: 30 tablet, Rfl: 1  •  dronedarone (Multaq) 400 MG tablet, Take 1 tablet by mouth 2 (Two) Times a Day With Meals., Disp: 180 tablet, Rfl: 3  •  metFORMIN (GLUCOPHAGE) 500 MG tablet, , Disp: , Rfl:       Review of Systems   Constitutional: Negative for chills, decreased appetite, fever and weight loss.   HENT: Negative for congestion, nosebleeds and sore throat.    Eyes: Negative for blurred vision, visual disturbance and visual halos.   Cardiovascular: Negative for chest pain, dyspnea on exertion and leg swelling.   Respiratory: Negative for cough, shortness of breath, sputum production and wheezing.    Endocrine: Negative for cold intolerance and polyuria.   Hematologic/Lymphatic: Negative for bleeding problem. Bruises/bleeds easily.        Does not report S/S of adverse bleeding event including nose bleeds, hematuria, melena, gingival bleeding, or hematemesis.   Skin: Negative for flushing, nail changes and unusual hair distribution.   Musculoskeletal: Positive for arthritis, back pain and joint pain.   Gastrointestinal: Negative for bloating, abdominal pain, hematemesis, melena,  "nausea and vomiting.   Genitourinary: Negative for flank pain and hematuria.   Neurological: Negative for brief paralysis, difficulty with concentration, focal weakness, light-headedness, loss of balance, numbness, paresthesias and weakness.        No amaurosis fugax, TIA, or CVA.     Psychiatric/Behavioral: Negative for altered mental status, depression, substance abuse and suicidal ideas.   Allergic/Immunologic: Negative for hives and persistent infections.         Vitals:    03/31/22 1118   BP: 118/74   BP Location: Left arm   Pulse: 67   Temp: 97.7 °F (36.5 °C)   TempSrc: Infrared   SpO2: 97%   Weight: 116 kg (256 lb 3.2 oz)   Height: 182.9 cm (72\")     Physical Exam   Constitutional: He is oriented to person, place, and time. He appears well-developed.   HENT:   Head: Normocephalic and atraumatic.   Eyes: Pupils are equal, round, and reactive to light. Conjunctivae are normal.   Neck: No JVD present.   Cardiovascular: Normal rate, regular rhythm and normal heart sounds.   No murmur heard.  BLE distal signals triphasic.    Pulmonary/Chest: Effort normal and breath sounds normal. No respiratory distress.   Abdominal: Soft. Bowel sounds are normal. He exhibits no distension. There is no abdominal tenderness.   Musculoskeletal: Normal range of motion. No tenderness.   Lymphadenopathy:     He has no cervical adenopathy.   Neurological: He is alert and oriented to person, place, and time. No cranial nerve deficit. Coordination normal.   Skin: Skin is warm and dry. Capillary refill takes less than 2 seconds. He is not diaphoretic. No erythema.   There is no evidence of skin breakdown of the bilateral lower extremities.  BLE pink, no evidence of ischemia.  Feet are absent of dependent rubor.   Psychiatric: His behavior is normal. Judgment normal.   Nursing note and vitals reviewed.      Assessment & Plan     Independent Review of Studies    1. Paroxysmal atrial fibrillation (HCC)  Indication for " anticoagulation  Continued follow up with cardiology    2. Encounter for anticoagulation discussion and counseling  Adverse bleeding events that require evaluation includes blood in the urine, stool, gums, and nose.  If you are to experience these symptoms you should present to the heart and vascular center for evaluation.  Avoid NSAID's such as ibuprofen and naproxen for pain relief as these medications increase your risk of gastrointestinal bleeding.  Over the counter supplements such as garlic, gingko biloba, and other herbs may increase bleeding risks.     Return one year for anticoagulation follow up, CBC    Lab Results   Component Value Date    WBC 5.13 03/30/2021    HGB 14.9 03/30/2021    HCT 42.9 03/30/2021    MCV 88.6 03/30/2021     03/30/2021     Lab Results   Component Value Date    GLUCOSE 120 (H) 06/02/2020    CALCIUM 9.5 01/11/2022     01/11/2022    K 5.1 01/11/2022    CO2 30 01/11/2022     01/11/2022    BUN 16 01/11/2022    CREATININE 1.0 01/11/2022    EGFRIFAFRI 90 10/07/2021    EGFRIFNONA 74 06/02/2020    BCR 14.7 06/02/2020    ANIONGAP 6.0 06/02/2020             Detailed discussion regarding risks, benefits, and treatment plan. Images independently reviewed. Patient understands, agrees, and wishes to proceed with plan.       This document has been electronically signed by KYLEIGH Shepard-BC Ramin  On April 1, 2022 13:54 CDT

## 2022-05-11 ENCOUNTER — CLINICAL SUPPORT (OUTPATIENT)
Dept: CARDIOLOGY | Facility: CLINIC | Age: 67
End: 2022-05-11

## 2022-05-11 DIAGNOSIS — I48.0 PAROXYSMAL ATRIAL FIBRILLATION: Chronic | ICD-10-CM

## 2022-05-11 PROCEDURE — 93288 INTERROG EVL PM/LDLS PM IP: CPT | Performed by: INTERNAL MEDICINE

## 2022-05-11 NOTE — PROGRESS NOTES
Pacemaker Evaluation Report    May 11, 2022    Primary Cardiologist: Dr Ramos  Implanting MD: Dr. Ramos  : [employer]  Model: [employer] DR RF 2210  Serial Number: [id number]  Implant date: 8/2/2011    Reason for evaluation:routine PPM Office  Cardiac device indication(s): sick sinus syndrome    Battery  HUMA: 2.60 HUMA     Interrogation Results  Ventricular sensing: R wave: >12mV  Ventricular capture: 1.5 V @ 0.5 ms  Ventricular lead impedance: right  450 ohms    Parameters  Mode: VVIR  Base Rate: 60    Diagnostic Data  Ventricular paced: 33 %  Mode switch: 0%  AT/AF [place]: 0%  AHR: 0  VHR: 0    Intrinsic Rate: 72    Changes made:  Ventricular output increased to 2.5 V to 3 V.     Conclusions: normal device function. Will schedule for generator change.    Assessment    VVI pacemaker on recent interrogation at HUMA pacing the ventricle about 33%.  Patient is coming on Friday to discuss generator replacement            This document has been electronically signed by Gautam Ramos MD on May 11, 2022 14:41 CDT

## 2022-05-13 ENCOUNTER — PREP FOR SURGERY (OUTPATIENT)
Dept: OTHER | Facility: HOSPITAL | Age: 67
End: 2022-05-13

## 2022-05-13 ENCOUNTER — OFFICE VISIT (OUTPATIENT)
Dept: CARDIOLOGY | Facility: CLINIC | Age: 67
End: 2022-05-13

## 2022-05-13 VITALS
HEART RATE: 60 BPM | OXYGEN SATURATION: 97 % | WEIGHT: 248 LBS | DIASTOLIC BLOOD PRESSURE: 90 MMHG | HEIGHT: 72 IN | SYSTOLIC BLOOD PRESSURE: 142 MMHG | BODY MASS INDEX: 33.59 KG/M2

## 2022-05-13 DIAGNOSIS — T82.111D MALFUNCTION OF CARDIAC PACEMAKER, SUBSEQUENT ENCOUNTER: Primary | ICD-10-CM

## 2022-05-13 DIAGNOSIS — I48.0 PAROXYSMAL ATRIAL FIBRILLATION: Primary | ICD-10-CM

## 2022-05-13 DIAGNOSIS — I49.5 SSS (SICK SINUS SYNDROME): ICD-10-CM

## 2022-05-13 DIAGNOSIS — I36.1 NON-RHEUMATIC TRICUSPID VALVE INSUFFICIENCY: ICD-10-CM

## 2022-05-13 DIAGNOSIS — R55 VASOVAGAL SYNCOPE: ICD-10-CM

## 2022-05-13 PROCEDURE — 93000 ELECTROCARDIOGRAM COMPLETE: CPT | Performed by: INTERNAL MEDICINE

## 2022-05-13 PROCEDURE — 99214 OFFICE O/P EST MOD 30 MIN: CPT | Performed by: INTERNAL MEDICINE

## 2022-05-13 RX ORDER — SODIUM CHLORIDE 0.9 % (FLUSH) 0.9 %
3 SYRINGE (ML) INJECTION EVERY 12 HOURS SCHEDULED
Status: CANCELLED | OUTPATIENT
Start: 2022-05-26

## 2022-05-13 RX ORDER — SODIUM CHLORIDE 0.9 % (FLUSH) 0.9 %
10 SYRINGE (ML) INJECTION AS NEEDED
Status: CANCELLED | OUTPATIENT
Start: 2022-05-26

## 2022-05-13 RX ORDER — BUPIVACAINE HCL/0.9 % NACL/PF 0.1 %
2 PLASTIC BAG, INJECTION (ML) EPIDURAL ONCE
Status: CANCELLED | OUTPATIENT
Start: 2022-05-26 | End: 2022-05-13

## 2022-05-13 NOTE — PROGRESS NOTES
Baljinder Blanca  66 y.o. male    5/13/2022  1. Paroxysmal atrial fibrillation (HCC)    2. SSS (sick sinus syndrome) (HCC)    3. Non-rheumatic tricuspid valve insufficiency    4. Vasovagal syncope        History of Present Illness:          Body mass index is 33.63 kg/m². BMI is above normal parameters. Recommendations include: exercise counseling, nutrition counseling and referral to primary care.    66 years old patient with a background history of sick sinus syndrome s/p pacemaker implantation.  The pacemaker reached HUMA status and presented to discuss generator placement.  Patient with a history of varicose vein s/p ablations and pulmonary embolism and DVT on long-term oral anticoagulation's and paroxysmal atrial fibrillation.  History of vasovagal syncope no further records.  Preserved ventricular function.  No symptoms of cardiac decompensation such orthopnea PND chest pain lightheaded dizziness reported      Pacemaker interrogations May 2022    Assessment     VVI pacemaker on recent interrogation at HUMA pacing the ventricle about 33%.  Patient is coming on Friday to discuss generator replacement     Echo 6/1/2020    · Estimated EF = 50%.  · Left ventricular systolic function is normal.  · Left ventricular diastolic dysfunction (grade I) consistent with impaired relaxation.  · Left atrial cavity size is borderline dilated.  · Mild tricuspid valve regurgitation is present.  · Right ventricular cavity is borderline dilated.      CT pulmonary angiogram 5/31/2020  MPRESSION:     There is presence of filling defects in the bilateral main  pulmonary arteries, extending peripherally into the main  bilateral segmental and subsegmental branches of the pulmonary  arteries, suggestive of significant bilateral pulmonary embolism.     Note is again made of pathologically enlarged lymph nodes in the  AP window,, right hilum, right paratracheal region and the  precarinal region, the largest of these lymph nodes seen in  the  right paratracheal region measuring 15 mm in short axis  dimension.     Note is made of few subcentimeter nonobstructive calculi in the  partially visualized bilateral kidneys      Venous Doppler 5/31/2020    IMPRESSION:  Echogenic noncompressible thrombi distal left  superficial femoral vein above the knee and posterior calf veins.    ECHO 10/2018  ·  Left ventricular wall thickness is consistent with mild concentric hypertrophy.  · Mild tricuspid valve regurgitation is present.  · Left ventricular systolic function is normal. Estimated EF = 60%.  Left ventricular diastolic dysfunction (grade I a) consistent with impaired relaxation.       Lipid 12/8/2020    Total Cholesterol   <200 mg/dL 179    Triglycerides   30 - 150 mg/dL 125    HDL Cholesterol   32 - 72 mg/dL 47    LDL Cholesterol    5 - 99 mg/dL 113High     Chol/HDL Ratio   4.2 - 5.6 3.8Low             1/2016  Total Protein 6.3 - 8.6 gm/dl 7.8    Albumin 3.4 - 4.8 gm/dl 4.5    Bilirubin, Direct 0.0 - 0.3 mg/dl 0.0    Total Bilirubin 0.2 - 1.3 mg/dl 0.6    Alkaline Phosphatase 38 - 126 U/L 115    AST (SGOT) 17 - 59 U/L 33    ALT (SGPT) 21 - 72 U/L 26    Resulting Agency               SUBJECTIVE:    No Known Allergies      Past Medical History:   Diagnosis Date   • Arrhythmia    • Atrial fibrillation (HCC)    • SSS (sick sinus syndrome) (HCC)          Past Surgical History:   Procedure Laterality Date   • GALLBLADDER SURGERY     • INSERT / REPLACE / REMOVE PACEMAKER     • SKIN LESION EXCISION     • VASECTOMY     • VEIN SURGERY           Family History   Problem Relation Age of Onset   • Diabetes Mother    • Stroke Mother    • Stroke Father          Social History     Socioeconomic History   • Marital status:    Tobacco Use   • Smoking status: Light Tobacco Smoker     Types: Cigars   • Smokeless tobacco: Former User     Types: Snuff   Vaping Use   • Vaping Use: Never used   Substance and Sexual Activity   • Alcohol use: No   • Drug use: No   • Sexual  "activity: Defer         Current Outpatient Medications   Medication Sig Dispense Refill   • apixaban (Eliquis) 5 MG tablet tablet Take 1 tablet by mouth Every 12 (Twelve) Hours. 60 tablet 11   • aspirin 81 MG chewable tablet Chew 1 tablet Daily. 30 tablet 1   • dronedarone (Multaq) 400 MG tablet Take 1 tablet by mouth 2 (Two) Times a Day With Meals. 180 tablet 3   • metFORMIN (GLUCOPHAGE) 500 MG tablet        No current facility-administered medications for this visit.           Review of Systems:     Constitutional:  Denies recent weight loss, weight gain,no change in exercise tolerance.     HENT:  Denies any hearing loss, epistaxis, hoarseness    Eyes: No blurring    Respiratory: History of bilateral pulmonary embolism    Cardiovascular: H&P.     Gastrointestinal:  Denies change in bowel habits, dyspepsia, ulcer disease, hematochezia, or melena.     Endocrine: Negative for cold intolerance, heat intolerance, polydipsia, polyphagia and polyuria.         Musculoskeletal: Denies any history of arthritic symptoms or back problems.     Skin:  Denies any change in hair or nails, rashes, or skin lesions.     Allergic/Immunologic: Negative.  Negative for environmental allergies, food allergies    Neurological:  Denies any history of recurrent headaches, strokes    Hematological: Denies any food allergies, seasonal allergies    Psychiatric/Behavioral: Denies any history of depression      OBJECTIVE:    /90 (BP Location: Left arm, Patient Position: Sitting, Cuff Size: Adult)   Pulse 60   Ht 182.9 cm (72\")   Wt 112 kg (248 lb)   SpO2 97%   BMI 33.63 kg/m²       Physical Exam:     Constitutional no apparent distress    HEENT atraumatic, conjunctive is pink thyroid is nonpalpable no jugular is distention    Lung clear to auscultation no rales or wheezing    Cardiovascular regular rate rhythm no S3 no pericardial rub    Abdominal soft nontender normal active bowel sounds    Extremities trace edema positive peripheral " pulses.     Musculoskeletal no evidence of active inflammation    Neurological no gross focal abnormality    Skin no rash    Psychiatric good mood and good personality            Procedures      Lab Results   Component Value Date    WBC 5.13 03/30/2021    HGB 14.9 03/30/2021    HCT 42.9 03/30/2021    MCV 88.6 03/30/2021     03/30/2021     Lab Results   Component Value Date    GLUCOSE 120 (H) 06/02/2020    BUN 12 05/11/2022    CREATININE 1.0 05/11/2022    EGFRIFNONA 74 06/02/2020    EGFRIFAFRI 90 10/07/2021    BCR 14.7 06/02/2020    CO2 29 05/11/2022    CALCIUM 9.5 05/11/2022    ALBUMIN 4.1 05/11/2022    AST 24 05/11/2022    ALT 20 05/11/2022     Lab Results   Component Value Date    CHOL 179 12/08/2020    CHOL 193 06/11/2020     Lab Results   Component Value Date    TRIG 120 01/11/2022    TRIG 103 07/12/2021    TRIG 125 12/08/2020     Lab Results   Component Value Date    HDL 54 01/11/2022    HDL 64 07/12/2021    HDL 47 12/08/2020     No components found for: LDLCALC  Lab Results   Component Value Date     01/11/2022     07/12/2021     (H) 12/08/2020     No results found for: HDLLDLRATIO  No components found for: CHOLHDL  Lab Results   Component Value Date    HGBA1C 6.6 (H) 05/11/2022     Lab Results   Component Value Date    TSH 1.97 01/29/2016           ASSESSMENT AND PLAN:    #1sick sinus syndrome status post pacemaker  Recently pacemaker interrogated reached HUMA status previously the patient was in atrial fibrillation and was changed from DDD to VVI.  He is back in sinus rhythm with changes back to DDD after pacemaker generator replacement  Procedure risk regarding the pacemaker generator replacement discussed with the patient.  Risk included but not limited to infection, bleeding, stroke, pneumothorax, hematoma.  Risk range less than 1 to 70%.  Understand willing to proceed forward.  Is a Mallampati score 2 and ASA class II.        #2 paroxysmal atrial fibrillation continued sinus  rhythm.    Continue Multaq sample was given to the patient    [id number] score is 2    Given the history of DVT and massive bilateral pulmonary embolism we will continue oral anticoagulation    #3 hypertension manage the risk factor lifestyle modification  Preventive    Preventivem obesity with a BMI of 33 with history of paroxysmal atrial fibrillation's and DVT and diabetes    Significantly low carbohydrate, low-fat, DASH diet graded exercise discussed.    I spent 20 minutes caring for Baljinder on this date of service. This time includes time spent by me includes counseling/coordination of care as relates to the presenting problem and any ordered procedures/tests as outlined above.                 This document has been electronically signed by Gautam Ramos MD on May 13, 2022 11:54 CDT      Diagnoses and all orders for this visit:    1. Paroxysmal atrial fibrillation (HCC) (Primary)  -     ECG 12 Lead    2. SSS (sick sinus syndrome) (HCC)    3. Non-rheumatic tricuspid valve insufficiency    4. Vasovagal syncope          Gautam Ramos MD  5/13/2022  11:48 CDT

## 2022-05-16 PROBLEM — T82.111A COMPLICATIONS, MECHANICAL, PACEMAKER, CARDIAC: Status: ACTIVE | Noted: 2022-05-16

## 2022-05-16 LAB
QT INTERVAL: 438 MS
QTC INTERVAL: 438 MS

## 2022-05-26 ENCOUNTER — APPOINTMENT (OUTPATIENT)
Dept: GENERAL RADIOLOGY | Facility: HOSPITAL | Age: 67
End: 2022-05-26

## 2022-05-26 ENCOUNTER — HOSPITAL ENCOUNTER (OUTPATIENT)
Facility: HOSPITAL | Age: 67
Discharge: HOME | End: 2022-05-27
Attending: INTERNAL MEDICINE | Admitting: INTERNAL MEDICINE

## 2022-05-26 DIAGNOSIS — T82.111D MALFUNCTION OF CARDIAC PACEMAKER, SUBSEQUENT ENCOUNTER: ICD-10-CM

## 2022-05-26 LAB
ANION GAP SERPL CALCULATED.3IONS-SCNC: 10 MMOL/L (ref 5–15)
BUN SERPL-MCNC: 15 MG/DL (ref 8–23)
BUN/CREAT SERPL: 14.7 (ref 7–25)
CALCIUM SPEC-SCNC: 9.1 MG/DL (ref 8.6–10.5)
CHLORIDE SERPL-SCNC: 104 MMOL/L (ref 98–107)
CO2 SERPL-SCNC: 22 MMOL/L (ref 22–29)
CREAT SERPL-MCNC: 1.02 MG/DL (ref 0.76–1.27)
DEPRECATED RDW RBC AUTO: 43.1 FL (ref 37–54)
EGFRCR SERPLBLD CKD-EPI 2021: 81.1 ML/MIN/1.73
ERYTHROCYTE [DISTWIDTH] IN BLOOD BY AUTOMATED COUNT: 13 % (ref 12.3–15.4)
GLUCOSE SERPL-MCNC: 128 MG/DL (ref 65–99)
HCT VFR BLD AUTO: 42.7 % (ref 37.5–51)
HGB BLD-MCNC: 14.9 G/DL (ref 13–17.7)
INR PPP: 0.98 (ref 0.8–1.2)
MCH RBC QN AUTO: 31.6 PG (ref 26.6–33)
MCHC RBC AUTO-ENTMCNC: 34.9 G/DL (ref 31.5–35.7)
MCV RBC AUTO: 90.7 FL (ref 79–97)
PLATELET # BLD AUTO: 240 10*3/MM3 (ref 140–450)
PMV BLD AUTO: 9.6 FL (ref 6–12)
POTASSIUM SERPL-SCNC: 4.5 MMOL/L (ref 3.5–5.2)
PROTHROMBIN TIME: 12.8 SECONDS (ref 11.1–15.3)
RBC # BLD AUTO: 4.71 10*6/MM3 (ref 4.14–5.8)
SODIUM SERPL-SCNC: 136 MMOL/L (ref 136–145)
WBC NRBC COR # BLD: 5.94 10*3/MM3 (ref 3.4–10.8)

## 2022-05-26 PROCEDURE — C1894 INTRO/SHEATH, NON-LASER: HCPCS | Performed by: INTERNAL MEDICINE

## 2022-05-26 PROCEDURE — C1898 LEAD, PMKR, OTHER THAN TRANS: HCPCS | Performed by: INTERNAL MEDICINE

## 2022-05-26 PROCEDURE — 33206 INSERT HEART PM ATRIAL: CPT | Performed by: INTERNAL MEDICINE

## 2022-05-26 PROCEDURE — 25010000002 MIDAZOLAM PER 1 MG: Performed by: INTERNAL MEDICINE

## 2022-05-26 PROCEDURE — 25010000002 GENTAMICIN PER 80 MG: Performed by: INTERNAL MEDICINE

## 2022-05-26 PROCEDURE — 99153 MOD SED SAME PHYS/QHP EA: CPT | Performed by: INTERNAL MEDICINE

## 2022-05-26 PROCEDURE — 85610 PROTHROMBIN TIME: CPT | Performed by: INTERNAL MEDICINE

## 2022-05-26 PROCEDURE — 93010 ELECTROCARDIOGRAM REPORT: CPT | Performed by: INTERNAL MEDICINE

## 2022-05-26 PROCEDURE — 80048 BASIC METABOLIC PNL TOTAL CA: CPT | Performed by: INTERNAL MEDICINE

## 2022-05-26 PROCEDURE — 85027 COMPLETE CBC AUTOMATED: CPT | Performed by: INTERNAL MEDICINE

## 2022-05-26 PROCEDURE — 33228 REMV&REPLC PM GEN DUAL LEAD: CPT | Performed by: INTERNAL MEDICINE

## 2022-05-26 PROCEDURE — C1785 PMKR, DUAL, RATE-RESP: HCPCS | Performed by: INTERNAL MEDICINE

## 2022-05-26 PROCEDURE — 93005 ELECTROCARDIOGRAM TRACING: CPT | Performed by: INTERNAL MEDICINE

## 2022-05-26 PROCEDURE — 33233 REMOVAL OF PM GENERATOR: CPT | Performed by: INTERNAL MEDICINE

## 2022-05-26 PROCEDURE — 99152 MOD SED SAME PHYS/QHP 5/>YRS: CPT | Performed by: INTERNAL MEDICINE

## 2022-05-26 PROCEDURE — 0 LIDOCAINE 1 % SOLUTION: Performed by: INTERNAL MEDICINE

## 2022-05-26 PROCEDURE — 0 IOPAMIDOL PER 1 ML: Performed by: INTERNAL MEDICINE

## 2022-05-26 PROCEDURE — 71045 X-RAY EXAM CHEST 1 VIEW: CPT

## 2022-05-26 PROCEDURE — 25010000002 CEFAZOLIN PER 500 MG: Performed by: INTERNAL MEDICINE

## 2022-05-26 PROCEDURE — 25010000002 FENTANYL CITRATE (PF) 50 MCG/ML SOLUTION: Performed by: INTERNAL MEDICINE

## 2022-05-26 DEVICE — GEN PM ASSURITY MRI DR RF PM2272: Type: IMPLANTABLE DEVICE | Site: CHEST WALL | Status: FUNCTIONAL

## 2022-05-26 DEVICE — LD PM TENDRIL STS 6F46CM 2088TC46: Type: IMPLANTABLE DEVICE | Site: HEART | Status: FUNCTIONAL

## 2022-05-26 RX ORDER — FENTANYL CITRATE 50 UG/ML
INJECTION, SOLUTION INTRAMUSCULAR; INTRAVENOUS AS NEEDED
Status: DISCONTINUED | OUTPATIENT
Start: 2022-05-26 | End: 2022-05-26 | Stop reason: HOSPADM

## 2022-05-26 RX ORDER — ACETAMINOPHEN 650 MG/1
650 SUPPOSITORY RECTAL EVERY 4 HOURS PRN
Status: DISCONTINUED | OUTPATIENT
Start: 2022-05-26 | End: 2022-05-27 | Stop reason: HOSPADM

## 2022-05-26 RX ORDER — LIDOCAINE HYDROCHLORIDE 10 MG/ML
INJECTION, SOLUTION INFILTRATION; PERINEURAL AS NEEDED
Status: DISCONTINUED | OUTPATIENT
Start: 2022-05-26 | End: 2022-05-26 | Stop reason: HOSPADM

## 2022-05-26 RX ORDER — SODIUM CHLORIDE 0.9 % (FLUSH) 0.9 %
10 SYRINGE (ML) INJECTION EVERY 12 HOURS SCHEDULED
Status: DISCONTINUED | OUTPATIENT
Start: 2022-05-26 | End: 2022-05-27 | Stop reason: HOSPADM

## 2022-05-26 RX ORDER — MIDAZOLAM HYDROCHLORIDE 1 MG/ML
INJECTION INTRAMUSCULAR; INTRAVENOUS AS NEEDED
Status: DISCONTINUED | OUTPATIENT
Start: 2022-05-26 | End: 2022-05-26 | Stop reason: HOSPADM

## 2022-05-26 RX ORDER — SOTALOL HYDROCHLORIDE 80 MG/1
80 TABLET ORAL EVERY 12 HOURS SCHEDULED
Status: DISCONTINUED | OUTPATIENT
Start: 2022-05-26 | End: 2022-05-27 | Stop reason: HOSPADM

## 2022-05-26 RX ORDER — ASPIRIN 81 MG/1
81 TABLET, CHEWABLE ORAL DAILY
Status: DISCONTINUED | OUTPATIENT
Start: 2022-05-26 | End: 2022-05-27 | Stop reason: HOSPADM

## 2022-05-26 RX ORDER — ONDANSETRON 2 MG/ML
4 INJECTION INTRAMUSCULAR; INTRAVENOUS EVERY 6 HOURS PRN
Status: DISCONTINUED | OUTPATIENT
Start: 2022-05-26 | End: 2022-05-27 | Stop reason: HOSPADM

## 2022-05-26 RX ORDER — SODIUM CHLORIDE 0.9 % (FLUSH) 0.9 %
10 SYRINGE (ML) INJECTION AS NEEDED
Status: DISCONTINUED | OUTPATIENT
Start: 2022-05-26 | End: 2022-05-26 | Stop reason: HOSPADM

## 2022-05-26 RX ORDER — BUPIVACAINE HCL/0.9 % NACL/PF 0.1 %
2 PLASTIC BAG, INJECTION (ML) EPIDURAL ONCE
Status: COMPLETED | OUTPATIENT
Start: 2022-05-26 | End: 2022-05-26

## 2022-05-26 RX ORDER — SODIUM CHLORIDE 0.9 % (FLUSH) 0.9 %
10 SYRINGE (ML) INJECTION AS NEEDED
Status: DISCONTINUED | OUTPATIENT
Start: 2022-05-26 | End: 2022-05-27 | Stop reason: HOSPADM

## 2022-05-26 RX ORDER — ACETAMINOPHEN 325 MG/1
650 TABLET ORAL EVERY 4 HOURS PRN
Status: DISCONTINUED | OUTPATIENT
Start: 2022-05-26 | End: 2022-05-27 | Stop reason: HOSPADM

## 2022-05-26 RX ORDER — SODIUM CHLORIDE 0.9 % (FLUSH) 0.9 %
3 SYRINGE (ML) INJECTION EVERY 12 HOURS SCHEDULED
Status: DISCONTINUED | OUTPATIENT
Start: 2022-05-26 | End: 2022-05-26 | Stop reason: HOSPADM

## 2022-05-26 RX ORDER — SODIUM CHLORIDE 9 MG/ML
50 INJECTION, SOLUTION INTRAVENOUS CONTINUOUS
Status: DISCONTINUED | OUTPATIENT
Start: 2022-05-26 | End: 2022-05-26

## 2022-05-26 RX ADMIN — Medication 10 ML: at 13:40

## 2022-05-26 RX ADMIN — ASPIRIN 81 MG: 81 TABLET, CHEWABLE ORAL at 13:35

## 2022-05-26 RX ADMIN — SOTALOL HYDROCHLORIDE 80 MG: 80 TABLET ORAL at 21:41

## 2022-05-26 RX ADMIN — GENTAMICIN SULFATE 80 MG: 40 INJECTION, SOLUTION INTRAMUSCULAR; INTRAVENOUS at 09:16

## 2022-05-26 RX ADMIN — Medication 10 ML: at 21:41

## 2022-05-26 RX ADMIN — SODIUM CHLORIDE 50 ML/HR: 9 INJECTION, SOLUTION INTRAVENOUS at 06:28

## 2022-05-26 RX ADMIN — SOTALOL HYDROCHLORIDE 80 MG: 80 TABLET ORAL at 13:35

## 2022-05-26 RX ADMIN — Medication 2 G: at 08:35

## 2022-05-27 VITALS
HEIGHT: 72 IN | WEIGHT: 236 LBS | BODY MASS INDEX: 31.97 KG/M2 | SYSTOLIC BLOOD PRESSURE: 152 MMHG | HEART RATE: 60 BPM | OXYGEN SATURATION: 97 % | TEMPERATURE: 97.5 F | RESPIRATION RATE: 18 BRPM | DIASTOLIC BLOOD PRESSURE: 85 MMHG

## 2022-05-27 PROCEDURE — 93005 ELECTROCARDIOGRAM TRACING: CPT | Performed by: INTERNAL MEDICINE

## 2022-05-27 PROCEDURE — 99024 POSTOP FOLLOW-UP VISIT: CPT | Performed by: INTERNAL MEDICINE

## 2022-05-27 PROCEDURE — 93010 ELECTROCARDIOGRAM REPORT: CPT | Performed by: INTERNAL MEDICINE

## 2022-05-27 RX ORDER — SOTALOL HYDROCHLORIDE 80 MG/1
80 TABLET ORAL EVERY 12 HOURS SCHEDULED
Qty: 60 TABLET | Refills: 6 | Status: SHIPPED | OUTPATIENT
Start: 2022-05-27 | End: 2022-12-16

## 2022-05-27 RX ADMIN — SOTALOL HYDROCHLORIDE 80 MG: 80 TABLET ORAL at 09:16

## 2022-05-27 RX ADMIN — ASPIRIN 81 MG: 81 TABLET, CHEWABLE ORAL at 09:16

## 2022-05-27 RX ADMIN — METFORMIN HYDROCHLORIDE 500 MG: 500 TABLET ORAL at 09:16

## 2022-05-27 RX ADMIN — Medication 10 ML: at 09:17

## 2022-05-29 LAB
QT INTERVAL: 450 MS
QT INTERVAL: 450 MS
QT INTERVAL: 456 MS
QTC INTERVAL: 450 MS
QTC INTERVAL: 453 MS
QTC INTERVAL: 456 MS

## 2022-07-08 ENCOUNTER — OFFICE VISIT (OUTPATIENT)
Dept: CARDIOLOGY | Facility: CLINIC | Age: 67
End: 2022-07-08

## 2022-07-08 VITALS
WEIGHT: 239.6 LBS | HEIGHT: 72 IN | SYSTOLIC BLOOD PRESSURE: 100 MMHG | OXYGEN SATURATION: 98 % | BODY MASS INDEX: 32.45 KG/M2 | DIASTOLIC BLOOD PRESSURE: 56 MMHG | HEART RATE: 70 BPM

## 2022-07-08 DIAGNOSIS — I48.0 PAROXYSMAL ATRIAL FIBRILLATION: Chronic | ICD-10-CM

## 2022-07-08 DIAGNOSIS — I36.1 NON-RHEUMATIC TRICUSPID VALVE INSUFFICIENCY: Primary | ICD-10-CM

## 2022-07-08 DIAGNOSIS — I49.5 SSS (SICK SINUS SYNDROME): ICD-10-CM

## 2022-07-08 PROCEDURE — 99024 POSTOP FOLLOW-UP VISIT: CPT | Performed by: INTERNAL MEDICINE

## 2022-07-08 NOTE — PROGRESS NOTES
Baljinder Blanca  67 y.o. male    7/8/2022  1. Non-rheumatic tricuspid valve insufficiency    2. SSS (sick sinus syndrome) (HCC)    3. Paroxysmal atrial fibrillation (HCC)        History of Present Illness:          Body mass index is 32.5 kg/m². BMI is above normal parameters. Recommendations include: exercise counseling, nutrition counseling and referral to primary care.    67 years old patient presented today, 7/8/2022 post hospital follow-up with history of pacemaker generator replacement and replacement of right atrial lead has significant provement in quality of life and improvement in lower extremity edema he is a pleased with clinical outcome.  He does have history of DVT and pulmonary embolism s/p varicose vein ablations and on long-term oral anticoagulation given history of paroxysmal atrial fibrillation and sick sinus syndrome.  He is a pleased with her clinical outcome.  Maintained sinus rhythm with a Betapace      Date of Procedure: 05/26/22     Referring Physician:      /ELECTROPHYSIOLOGIST:            PROCEDURE(S) PERFORMED:    1. Removal of a dual-chamber permanent pacemaker generator.  2. Insertion of a dual-chamber permanent pacemaker generator.           3.                                                 new atrial lead placement  INDICATIONS FOR PROCDEDURE:            Pacemaker history HUMA status                                                              Malfunction atrial lead         Echo 6/1/2020    · Estimated EF = 50%.  · Left ventricular systolic function is normal.  · Left ventricular diastolic dysfunction (grade I) consistent with impaired relaxation.  · Left atrial cavity size is borderline dilated.  · Mild tricuspid valve regurgitation is present.  · Right ventricular cavity is borderline dilated.      CT pulmonary angiogram 5/31/2020  MPRESSION:     There is presence of filling defects in the bilateral main  pulmonary arteries, extending peripherally into the  main  bilateral segmental and subsegmental branches of the pulmonary  arteries, suggestive of significant bilateral pulmonary embolism.     Note is again made of pathologically enlarged lymph nodes in the  AP window,, right hilum, right paratracheal region and the  precarinal region, the largest of these lymph nodes seen in the  right paratracheal region measuring 15 mm in short axis  dimension.     Note is made of few subcentimeter nonobstructive calculi in the  partially visualized bilateral kidneys      Venous Doppler 5/31/2020    IMPRESSION:  Echogenic noncompressible thrombi distal left  superficial femoral vein above the knee and posterior calf veins.    ECHO 10/2018  ·  Left ventricular wall thickness is consistent with mild concentric hypertrophy.  · Mild tricuspid valve regurgitation is present.  · Left ventricular systolic function is normal. Estimated EF = 60%.  Left ventricular diastolic dysfunction (grade I a) consistent with impaired relaxation.       Lipid 12/8/2020    Total Cholesterol   <200 mg/dL 179    Triglycerides   30 - 150 mg/dL 125    HDL Cholesterol   32 - 72 mg/dL 47    LDL Cholesterol    5 - 99 mg/dL 113High     Chol/HDL Ratio   4.2 - 5.6 3.8Low             1/2016  Total Protein 6.3 - 8.6 gm/dl 7.8    Albumin 3.4 - 4.8 gm/dl 4.5    Bilirubin, Direct 0.0 - 0.3 mg/dl 0.0    Total Bilirubin 0.2 - 1.3 mg/dl 0.6    Alkaline Phosphatase 38 - 126 U/L 115    AST (SGOT) 17 - 59 U/L 33    ALT (SGPT) 21 - 72 U/L 26    Resulting Agency               SUBJECTIVE:    No Known Allergies      Past Medical History:   Diagnosis Date   • Arrhythmia    • Atrial fibrillation (HCC)    • SSS (sick sinus syndrome) (HCC)          Past Surgical History:   Procedure Laterality Date   • CARDIAC ELECTROPHYSIOLOGY PROCEDURE N/A 5/26/2022    Procedure: PPM generator change - dual;  Surgeon: Gautam Ramos MD;  Location: [facility] CATH INVASIVE LOCATION;  Service: Cardiology;  Laterality: N/A;   • GALLBLADDER SURGERY     •  INSERT / REPLACE / REMOVE PACEMAKER     • SKIN LESION EXCISION     • VASECTOMY     • VEIN SURGERY           Family History   Problem Relation Age of Onset   • Diabetes Mother    • Stroke Mother    • Stroke Father          Social History     Socioeconomic History   • Marital status:    Tobacco Use   • Smoking status: Light Tobacco Smoker     Types: Cigars   • Smokeless tobacco: Former User     Types: Snuff   Vaping Use   • Vaping Use: Never used   Substance and Sexual Activity   • Alcohol use: No   • Drug use: No   • Sexual activity: Defer         Current Outpatient Medications   Medication Sig Dispense Refill   • apixaban (Eliquis) 5 MG tablet tablet Take 1 tablet by mouth Every 12 (Twelve) Hours. 60 tablet 11   • aspirin 81 MG chewable tablet Chew 1 tablet Daily. 30 tablet 1   • metFORMIN (GLUCOPHAGE) 500 MG tablet      • sotalol (BETAPACE) 80 MG tablet Take 1 tablet by mouth Every 12 (Twelve) Hours. 60 tablet 6     No current facility-administered medications for this visit.           Review of Systems:     Constitutional:  Denies recent weight loss, weight gain,no change in exercise tolerance.     HENT:  Denies any hearing loss, epistaxis, hoarseness    Eyes: No blurring    Respiratory: History of bilateral pulmonary embolism    Cardiovascular: H&P.     Gastrointestinal:  Denies change in bowel habits, dyspepsia, ulcer disease, hematochezia, or melena.     Endocrine: Negative for cold intolerance, heat intolerance, polydipsia, polyphagia and polyuria.         Musculoskeletal: Denies any history of arthritic symptoms or back problems.     Skin:  Denies any change in hair or nails, rashes, or skin lesions.     Allergic/Immunologic: Negative.  Negative for environmental allergies, food allergies    Neurological:  Denies any history of recurrent headaches, strokes    Hematological: Denies any food allergies, seasonal allergies    Psychiatric/Behavioral: Denies any history of depression      OBJECTIVE:    BP  "100/56   Pulse 70   Ht 182.9 cm (72\")   Wt 109 kg (239 lb 9.6 oz)   SpO2 98%   BMI 32.50 kg/m²       Physical Exam:     Constitutional no apparent distress    HEENT atraumatic, conjunctive is pink thyroid is nonpalpable no jugular is distention    Lung clear to auscultation no rales or wheezing    Cardiovascular regular rate rhythm no S3 no pericardial rub    Abdominal soft nontender normal active bowel sounds    Extremities trace edema positive peripheral pulses.     Musculoskeletal no evidence of active inflammation    Neurological no gross focal abnormality    Skin no rash    Psychiatric good mood and good personality            Procedures      Lab Results   Component Value Date    WBC 5.94 05/26/2022    HGB 14.9 05/26/2022    HCT 42.7 05/26/2022    MCV 90.7 05/26/2022     05/26/2022     Lab Results   Component Value Date    GLUCOSE 128 (H) 05/26/2022    BUN 15 05/26/2022    CREATININE 1.02 05/26/2022    EGFRIFNONA 74 06/02/2020    EGFRIFAFRI 90 10/07/2021    BCR 14.7 05/26/2022    CO2 22.0 05/26/2022    CALCIUM 9.1 05/26/2022    ALBUMIN 4.1 05/11/2022    AST 24 05/11/2022    ALT 20 05/11/2022     Lab Results   Component Value Date    CHOL 179 12/08/2020    CHOL 193 06/11/2020     Lab Results   Component Value Date    TRIG 120 01/11/2022    TRIG 103 07/12/2021    TRIG 125 12/08/2020     Lab Results   Component Value Date    HDL 54 01/11/2022    HDL 64 07/12/2021    HDL 47 12/08/2020     No components found for: LDLCALC  Lab Results   Component Value Date     01/11/2022     07/12/2021     (H) 12/08/2020     No results found for: HDLLDLRATIO  No components found for: CHOLHDL  Lab Results   Component Value Date    HGBA1C 6.6 (H) 05/11/2022     Lab Results   Component Value Date    TSH 1.97 01/29/2016           ASSESSMENT AND PLAN:    #1sick sinus syndrome status post pacemaker pacemaker generator and right atrial lead replacement    Right healed very well.  He is a pleased with " clinical outcome.  Significant provement in quality of life after giving the atrial lead and programming to DDD instead of VVI pacemaker.      #2 paroxysmal atrial fibrillation continued sinus rhythm.    Continue Betapace    [id number] score is 2    Given the history of DVT and massive bilateral pulmonary embolism we will continue oral anticoagulation    #3 hypertension manage the risk factor lifestyle modification    Preventive    Preventivem obesity with a BMI of 33.50 with history of paroxysmal atrial fibrillation's and DVT and diabetes    Significantly low carbohydrate, low-fat, DASH diet graded exercise discussed.    I spent 15 minutes caring for Baljinder on this date of service. This time includes time spent by me of counseling/coordination of care as relates to the presenting problem and any ordered procedures/tests as outlined above. .                   This document has been electronically signed by Gautam Ramos MD on July 8, 2022 10:25 CDT          Diagnoses and all orders for this visit:    1. Non-rheumatic tricuspid valve insufficiency (Primary)    2. SSS (sick sinus syndrome) (HCC)    3. Paroxysmal atrial fibrillation (HCC)          Gautam Ramos MD  7/8/2022  10:18 CDT

## 2022-08-24 ENCOUNTER — CLINICAL SUPPORT (OUTPATIENT)
Dept: CARDIOLOGY | Facility: CLINIC | Age: 67
End: 2022-08-24

## 2022-08-24 DIAGNOSIS — I49.5 SSS (SICK SINUS SYNDROME): Primary | ICD-10-CM

## 2022-08-24 PROCEDURE — 93288 INTERROG EVL PM/LDLS PM IP: CPT | Performed by: INTERNAL MEDICINE

## 2022-08-24 NOTE — PROGRESS NOTES
Pacemaker Evaluation Report    August 24, 2022    Primary Cardiologist: Dr Ramos  Implanting MD: Dr. Ramos  : [employer]  Model: Assurity MRI 2272  Serial Number: [id number]  Implant date: 5/26/22    Reason for evaluation:routine PPM Office  Cardiac device indication(s): sick sinus syndrome    Battery  HUMA: 9-9.5 years    Interrogation Results  Atrial sensing: >5mV  Atrial capture: 0.75 V - 0.5 ms  Atrial impedance: 450 ohms  Ventricular sensing: dependent  Ventricular capture: 1.0 V @ 0.5 ms  Ventricular lead impedance: right  410 ohms    Parameters  Mode: VVIR  Base Rate: 60    Diagnostic Data:  Atrial paced: 63%  Ventricular paced: >99 %  Mode switch: 0%  AT/AF [place]: 0%  AHR: 6  VHR: 0    Intrinsic Rate: dependent    Changes made:  None     Conclusions: normal device function. Follow up in one year, remote every 3 months.    Assessment    Sick sinus syndrome  Normal functioning pacemaker pacing the ventricle greater than 99% and atrium 63% time.  Battery voltage is good for greater than 7-year

## 2022-12-16 RX ORDER — SOTALOL HYDROCHLORIDE 80 MG/1
TABLET ORAL
Qty: 60 TABLET | Refills: 6 | Status: SHIPPED | OUTPATIENT
Start: 2022-12-16

## 2023-02-21 ENCOUNTER — OFFICE VISIT (OUTPATIENT)
Dept: CARDIOLOGY | Facility: CLINIC | Age: 68
End: 2023-02-21
Payer: MEDICARE

## 2023-02-21 VITALS
BODY MASS INDEX: 33.32 KG/M2 | HEART RATE: 66 BPM | HEIGHT: 72 IN | WEIGHT: 246 LBS | OXYGEN SATURATION: 95 % | SYSTOLIC BLOOD PRESSURE: 116 MMHG | DIASTOLIC BLOOD PRESSURE: 80 MMHG

## 2023-02-21 DIAGNOSIS — R55 VASOVAGAL SYNCOPE: ICD-10-CM

## 2023-02-21 DIAGNOSIS — E66.09 CLASS 1 OBESITY DUE TO EXCESS CALORIES WITHOUT SERIOUS COMORBIDITY WITH BODY MASS INDEX (BMI) OF 33.0 TO 33.9 IN ADULT: ICD-10-CM

## 2023-02-21 DIAGNOSIS — I49.5 SSS (SICK SINUS SYNDROME): ICD-10-CM

## 2023-02-21 DIAGNOSIS — I48.0 PAROXYSMAL ATRIAL FIBRILLATION: Primary | Chronic | ICD-10-CM

## 2023-02-21 PROCEDURE — 99213 OFFICE O/P EST LOW 20 MIN: CPT | Performed by: INTERNAL MEDICINE

## 2023-02-21 RX ORDER — DUTASTERIDE 0.5 MG/1
CAPSULE, LIQUID FILLED ORAL
COMMUNITY
Start: 2023-02-13

## 2023-02-21 RX ORDER — ALFUZOSIN HYDROCHLORIDE 10 MG/1
TABLET, EXTENDED RELEASE ORAL
COMMUNITY
Start: 2023-01-14

## 2023-02-21 NOTE — PROGRESS NOTES
Baljinder Blanca  67 y.o. male    2/21/2023  1. Paroxysmal atrial fibrillation (HCC)    2. SSS (sick sinus syndrome) (HCC)    3. Vasovagal syncope    4. Class 1 obesity due to excess calories without serious comorbidity with body mass index (BMI) of 33.0 to 33.9 in adult        History of Present Illness:          Body mass index is 33.36 kg/m². BMI is above normal parameters. Recommendations include: exercise counseling, nutrition counseling and referral to primary care.  67 years old patient presented today for routine follow-up with concurrent medical problem of paroxysmal atrial fibrillation, sick sinus syndrome s/p pacemaker, vasovagal syncope no further recurrence. follow-up with the pacer clinic.  Previous pacemaker evaluation good sensing and pacing threshold.  He had concurrent medical problem of DVT pulmonary embolism and s/p varicose vein ablation on long-term oral anticoagulation.  Maintained sinus rhythm with the Betapace.  He is a pleased with clinical outcome.  He remained physically active.  His other concurrent medical problem is type 2 diabetes his last hemoglobin A1c was 7.0    October 2022 abnormal lipid profile hemoglobin A1c 7.0  Date of Procedure: 05/26/22     Referring Physician:      /ELECTROPHYSIOLOGIST:            PROCEDURE(S) PERFORMED:    1. Removal of a dual-chamber permanent pacemaker generator.  2. Insertion of a dual-chamber permanent pacemaker generator.           3.                                                 new atrial lead placement  INDICATIONS FOR PROCDEDURE:            Pacemaker history HUMA status                                                              Malfunction atrial lead         Echo 6/1/2020    · Estimated EF = 50%.  · Left ventricular systolic function is normal.  · Left ventricular diastolic dysfunction (grade I) consistent with impaired relaxation.  · Left atrial cavity size is borderline dilated.  · Mild tricuspid valve regurgitation is  present.  · Right ventricular cavity is borderline dilated.      CT pulmonary angiogram 5/31/2020  MPRESSION:     There is presence of filling defects in the bilateral main  pulmonary arteries, extending peripherally into the main  bilateral segmental and subsegmental branches of the pulmonary  arteries, suggestive of significant bilateral pulmonary embolism.     Note is again made of pathologically enlarged lymph nodes in the  AP window,, right hilum, right paratracheal region and the  precarinal region, the largest of these lymph nodes seen in the  right paratracheal region measuring 15 mm in short axis  dimension.     Note is made of few subcentimeter nonobstructive calculi in the  partially visualized bilateral kidneys      Venous Doppler 5/31/2020    IMPRESSION:  Echogenic noncompressible thrombi distal left  superficial femoral vein above the knee and posterior calf veins.    ECHO 10/2018  ·  Left ventricular wall thickness is consistent with mild concentric hypertrophy.  · Mild tricuspid valve regurgitation is present.  · Left ventricular systolic function is normal. Estimated EF = 60%.  Left ventricular diastolic dysfunction (grade I a) consistent with impaired relaxation.       Lipid 12/8/2020    Total Cholesterol   <200 mg/dL 179    Triglycerides   30 - 150 mg/dL 125    HDL Cholesterol   32 - 72 mg/dL 47    LDL Cholesterol    5 - 99 mg/dL 113High     Chol/HDL Ratio   4.2 - 5.6 3.8Low             1/2016  Total Protein 6.3 - 8.6 gm/dl 7.8    Albumin 3.4 - 4.8 gm/dl 4.5    Bilirubin, Direct 0.0 - 0.3 mg/dl 0.0    Total Bilirubin 0.2 - 1.3 mg/dl 0.6    Alkaline Phosphatase 38 - 126 U/L 115    AST (SGOT) 17 - 59 U/L 33    ALT (SGPT) 21 - 72 U/L 26    Resulting Agency               SUBJECTIVE:    No Known Allergies      Past Medical History:   Diagnosis Date   • Arrhythmia    • Atrial fibrillation (HCC)    • SSS (sick sinus syndrome) (HCC)          Past Surgical History:   Procedure Laterality Date   • CARDIAC  ELECTROPHYSIOLOGY PROCEDURE N/A 5/26/2022    Procedure: PPM generator change - dual;  Surgeon: Gautam Ramos MD;  Location: [facility] INVASIVE LOCATION;  Service: Cardiology;  Laterality: N/A;   • GALLBLADDER SURGERY     • INSERT / REPLACE / REMOVE PACEMAKER     • SKIN LESION EXCISION     • VASECTOMY     • VEIN SURGERY           Family History   Problem Relation Age of Onset   • Diabetes Mother    • Stroke Mother    • Stroke Father          Social History     Socioeconomic History   • Marital status:    Tobacco Use   • Smoking status: Light Smoker     Types: Cigars   • Smokeless tobacco: Former     Types: Snuff   Vaping Use   • Vaping Use: Never used   Substance and Sexual Activity   • Alcohol use: No   • Drug use: No   • Sexual activity: Defer         Current Outpatient Medications   Medication Sig Dispense Refill   • alfuzosin (UROXATRAL) 10 MG 24 hr tablet TAKE 1 TAB EVERY NIGHT AT BEDTIME W/ GLASS WATER FOR PROSTATE.HOLD FOR LOW BLOOD PRESSURE/DIZZINESS     • apixaban (Eliquis) 5 MG tablet tablet Take 1 tablet by mouth Every 12 (Twelve) Hours. 60 tablet 11   • aspirin 81 MG chewable tablet Chew 1 tablet Daily. 30 tablet 1   • dutasteride (AVODART) 0.5 MG capsule TAKE 1 CAPSULE BY MOUTH NIGHTLY AT BEDTIME.     • metFORMIN (GLUCOPHAGE) 500 MG tablet      • sotalol (BETAPACE) 80 MG tablet TAKE 1 TABLET BY MOUTH EVERY 12 HOURS 60 tablet 6     No current facility-administered medications for this visit.           Review of Systems:     Constitutional:  Denies recent weight loss, weight gain,no change in exercise tolerance.     HENT:  Denies any hearing loss, epistaxis, hoarseness    Eyes: No blurring    Respiratory: History of bilateral pulmonary embolism    Cardiovascular: H&P.     Gastrointestinal:  Denies change in bowel habits, dyspepsia, ulcer disease    Endocrine: Negative for cold intolerance, heat intolerance, polydipsia      Musculoskeletal: Denies  arthritic symptoms or back problems.     Skin:   "Denies any change in hair or nails, rashes, or skin lesions.     Allergic/Immunologic: Negative.  Negative for environmental allergies, food allergies    Neurological:  Denies any history of recurrent headaches, strokes    Hematological: Denies any food allergies, seasonal allergies    Psychiatric/Behavioral: Denies any history of depression      OBJECTIVE:    /80 (BP Location: Left arm, Patient Position: Sitting, Cuff Size: Adult)   Pulse 66   Ht 182.9 cm (72\")   Wt 112 kg (246 lb)   SpO2 95%   BMI 33.36 kg/m²       Physical Exam:     Constitutional no apparent distress    HEENT atraumatic, conjunctive is pink thyroid is nonpalpable no jugular is distention    Lung clear to auscultation no rales or wheezing    Cardiovascular regular rate rhythm no S3 no pericardial rub    Abdominal soft nontender normal active bowel sounds    Extremities trace edema positive peripheral pulses.     Musculoskeletal no evidence of active inflammation    Neurological no gross focal abnormality    Skin no rash    Psychiatric good mood and good personality            Procedures      Lab Results   Component Value Date    WBC 5.94 05/26/2022    HGB 14.9 05/26/2022    HCT 42.7 05/26/2022    MCV 90.7 05/26/2022     05/26/2022     Lab Results   Component Value Date    GLUCOSE 128 (H) 05/26/2022    BUN 15 10/05/2022    CREATININE 1.0 10/05/2022    EGFRIFNONA 74 06/02/2020    EGFRIFAFRI 90 10/07/2021    BCR 14.7 05/26/2022    CO2 27 10/05/2022    CALCIUM 9.5 10/05/2022    ALBUMIN 4.4 10/05/2022    AST 27 10/05/2022    ALT 23 10/05/2022     Lab Results   Component Value Date    CHOL 179 12/08/2020    CHOL 193 06/11/2020     Lab Results   Component Value Date    TRIG 84 10/05/2022    TRIG 120 01/11/2022    TRIG 103 07/12/2021     Lab Results   Component Value Date    HDL 55 10/05/2022    HDL 54 01/11/2022    HDL 64 07/12/2021     No components found for: LDLCALC  Lab Results   Component Value Date     10/05/2022    LDL " 120 01/11/2022     07/12/2021     No results found for: HDLLDLRATIO  No components found for: CHOLHDL  Lab Results   Component Value Date    HGBA1C 7.0 (H) 10/05/2022     Lab Results   Component Value Date    TSH 1.97 01/29/2016           ASSESSMENT AND PLAN:    #1sick sinus syndrome status post pacemaker pacemaker generator and right atrial lead replacement    Significant improvement in quality of life after reprogramming the device to DDD    #2 paroxysmal atrial fibrillation continued sinus rhythm.    Continue Betapace    [id number] score is 2    Given the history of DVT and massive bilateral pulmonary embolism we will continue oral anticoagulation      Preventive  Class II obesity with BMI of 33-33.9 with history of diabetes and DVT    Significantly low carbohydrate, low-fat, DASH diet graded exercise discussed.    Smoking    Future risk of continued smoking explained to the patient    I spent 15 minutes caring for Baljinder on this date of service. This time includes time spent by me of counseling/coordination of care as relates to the presenting problem and any ordered procedures/tests as outlined above. .                   This document has been electronically signed by Gautam Ramos MD on February 21, 2023 11:50 CST          Diagnoses and all orders for this visit:    1. Paroxysmal atrial fibrillation (HCC) (Primary)    2. SSS (sick sinus syndrome) (HCC)    3. Vasovagal syncope    4. Class 1 obesity due to excess calories without serious comorbidity with body mass index (BMI) of 33.0 to 33.9 in adult          Gautam Ramos MD  2/21/2023  11:50 CST

## 2023-02-23 PROCEDURE — 93294 REM INTERROG EVL PM/LDLS PM: CPT | Performed by: INTERNAL MEDICINE

## 2023-02-23 PROCEDURE — 93296 REM INTERROG EVL PM/IDS: CPT | Performed by: INTERNAL MEDICINE

## 2023-04-10 RX ORDER — APIXABAN 5 MG/1
TABLET, FILM COATED ORAL
Qty: 180 TABLET | Refills: 2 | OUTPATIENT
Start: 2023-04-10

## 2023-04-17 RX ORDER — APIXABAN 5 MG/1
TABLET, FILM COATED ORAL
Qty: 180 TABLET | Refills: 2 | OUTPATIENT
Start: 2023-04-17

## 2023-04-21 RX ORDER — APIXABAN 5 MG/1
TABLET, FILM COATED ORAL
Qty: 180 TABLET | Refills: 2 | OUTPATIENT
Start: 2023-04-21

## 2023-04-28 ENCOUNTER — PREP FOR SURGERY (OUTPATIENT)
Dept: OTHER | Facility: HOSPITAL | Age: 68
End: 2023-04-28
Payer: MEDICARE

## 2023-04-28 DIAGNOSIS — D49.4 BLADDER NEOPLASM: Primary | ICD-10-CM

## 2023-05-02 PROBLEM — D49.4 BLADDER NEOPLASM: Status: ACTIVE | Noted: 2023-05-02

## 2023-05-12 ENCOUNTER — PRE-ADMISSION TESTING (OUTPATIENT)
Dept: PREADMISSION TESTING | Facility: HOSPITAL | Age: 68
End: 2023-05-12
Payer: MEDICARE

## 2023-05-12 ENCOUNTER — ANESTHESIA EVENT (OUTPATIENT)
Dept: PERIOP | Facility: HOSPITAL | Age: 68
End: 2023-05-12
Payer: MEDICARE

## 2023-05-12 VITALS
HEART RATE: 73 BPM | HEIGHT: 72 IN | BODY MASS INDEX: 33.59 KG/M2 | WEIGHT: 248 LBS | OXYGEN SATURATION: 98 % | SYSTOLIC BLOOD PRESSURE: 146 MMHG | DIASTOLIC BLOOD PRESSURE: 84 MMHG | RESPIRATION RATE: 16 BRPM

## 2023-05-12 LAB
ANION GAP SERPL CALCULATED.3IONS-SCNC: 10 MMOL/L (ref 5–15)
BUN SERPL-MCNC: 12 MG/DL (ref 8–23)
BUN/CREAT SERPL: 12 (ref 7–25)
CALCIUM SPEC-SCNC: 9.1 MG/DL (ref 8.6–10.5)
CHLORIDE SERPL-SCNC: 100 MMOL/L (ref 98–107)
CO2 SERPL-SCNC: 25 MMOL/L (ref 22–29)
CREAT SERPL-MCNC: 1 MG/DL (ref 0.76–1.27)
EGFRCR SERPLBLD CKD-EPI 2021: 82.5 ML/MIN/1.73
GLUCOSE SERPL-MCNC: 223 MG/DL (ref 65–99)
POTASSIUM SERPL-SCNC: 4.3 MMOL/L (ref 3.5–5.2)
QT INTERVAL: 448 MS
QTC INTERVAL: 476 MS
SODIUM SERPL-SCNC: 135 MMOL/L (ref 136–145)

## 2023-05-12 PROCEDURE — 36415 COLL VENOUS BLD VENIPUNCTURE: CPT

## 2023-05-12 PROCEDURE — 93005 ELECTROCARDIOGRAM TRACING: CPT

## 2023-05-12 PROCEDURE — 80048 BASIC METABOLIC PNL TOTAL CA: CPT

## 2023-05-12 RX ORDER — SODIUM CHLORIDE 9 MG/ML
1000 INJECTION, SOLUTION INTRAVENOUS CONTINUOUS
Status: CANCELLED | OUTPATIENT
Start: 2023-05-17

## 2023-05-12 RX ORDER — SOTALOL HYDROCHLORIDE 80 MG/1
80 TABLET ORAL 2 TIMES DAILY
COMMUNITY

## 2023-05-12 NOTE — PAT
Patient states he was instructed by MD to stop Aspirin 10 days prior to procedure and stop Eliquis 2 days prior to procedure.    Dr. Garza here to review EKG and speak with patient. No orders received, ok to proceed.

## 2023-05-12 NOTE — ANESTHESIA PREPROCEDURE EVALUATION
Anesthesia Evaluation     Patient summary reviewed and Nursing notes reviewed   no history of anesthetic complications:  NPO Solid Status: > 8 hours  NPO Liquid Status: < 2 hours           Airway   Mallampati: II  TM distance: >3 FB  Neck ROM: full  possible difficult intubation  Dental    (+) poor dentation    Pulmonary     breath sounds clear to auscultation  (+) pulmonary embolism (2020), a smoker Former,   (-) shortness of breath  Cardiovascular   Exercise tolerance: good (4-7 METS)    ECG reviewed  PT is on anticoagulation therapy  Patient on routine beta blocker and Beta blocker given within 24 hours of surgery  Rhythm: regular  Rate: normal    (+) pacemaker (2011) pacemaker, dysrhythmias (History of sick sinus syndrome.) Atrial Fib, DVT resolved,   (-) hypertension, past MI, angina, RUSSELL, murmur, carotid bruits, cardiac stents, CABG    ROS comment: Atrial-sensed ventricular-paced rhythm with occasional Premature ventricular complexes  Abnormal ECG  When compared with ECG of 27-MAY-2022 05:46,  Premature ventricular complexes are now Present  Vent. rate has increased BY   8 BPM? Estimated EF = 50%.  ? Left ventricular systolic function is normal.  ? Left ventricular diastolic dysfunction (grade I) consistent with impaired relaxation.  ? Left atrial cavity size is borderline dilated.  ? Mild tricuspid valve regurgitation is present.  ? Right ventricular cavity is borderline dilated.         Neuro/Psych  (-) seizures, TIA, CVA    ROS Comment: Bilateral hearing aides.  GI/Hepatic/Renal/Endo    (+) obesity,  GERD poorly controlled,  diabetes mellitus type 2 poorly controlled,     Musculoskeletal     Abdominal   (+) obese,    Substance History   (-) alcohol use, drug use     OB/GYN negative ob/gyn ROS         Other   arthritis,    history of cancer (Skin treated.)    ROS/Med Hx Other: T2DM: Last HgbA1C: 7.2                Anesthesia Plan    ASA 3     general and MAC   total IV anesthesia  intravenous induction      Anesthetic plan, risks, benefits, and alternatives have been provided, discussed and informed consent has been obtained with: patient and spouse/significant other.  Pre-procedure education provided      CODE STATUS:

## 2023-05-15 NOTE — H&P
[facility] History and Physical  SAUL CLEVELAND  67-year-old; :1955;;male  [address];[place] [address]  Ins: 1) [employer]  MRN:[id number]  MELANY JOLLY MD  UROLOGY [facility]  Allergies  No Known Drug Allergies Unknown  Current Medications  FLUOROURACIL 5% CREAM 40GM  DICLOFENAC 1% GEL 100GM APPLY 1 GRAM TOPICALLY TO THE AFFECTED AREA TWICE DAILY  SOTALOL 80MG TABLETS TAKE 1 TABLET BY MOUTH EVERY 12 HOURS  METFORMIN 500MG TABLETS  ELIQUIS 5MG TABLETS TAKE 1 TABLET BY MOUTH EVERY 12 HOURS  * Medications Reconciled  Medications Prescribed this Visit  dutasteride 0.5 mg oral capsule TAKE 1 CAPSULE BY MOUTH NIGHTLY AT BEDTIME.  alfuzosin 10 mg oral tablet, extended release 1 tablet oral at bedtime with glass of water for prostate, hold for low blood pressure or dizziness  Problem List  Blood in urine  Benign prostatic hyperplasia with outflow obstruction 2023  Kidney stone 2023  Medical History  Type 2 Diabetes Mellitus Without Complications  Cerebral Infarction, Unspecified  HAS HAD COLONSCOPY IN LAST 9 YEARS  Surgical History  GALLBLADDER SURGERY  Pacemaker  Psychiatric History  Patient is generally satisfied with life. No depression, anxiety or thoughts of suicide.  Family History  Family History Of Diabetes Mellitus MOM  Mother ; Father ; Brother ; Sister   Social History  DOESNT SMOKE. DOESNT DRINK. RETIRED.  AND LIVES WITH WIFE.  Imm/Inj/Office Meds History Comments  PT HAS HAD FLU AND PNEUMONIA VACCINES. COVID 2  Chief Complaint  HEMATURIA  History of Present Illness SAUL CLEVELAND is a 67-year-old male here for evaluation. He has a history of  HEMATURIA, kidney stones and BPH. Taking Uroxatral. Added Avodart last visit took for about 1.5 months. States noticed a notable difference but started reading all the side effects and quit. Discussed other options for his LUTs secondary to his moderately  obstructing prostate like UROLIFT or TURP. He has opted to go back to medication. Last PSA 1.52.   Cystoscopy only noted obstructing prostate and CT bilateral kidney stones and enlarged prostate but has had 2 abnormal urine cytology tests. Discussed with patient need for bladder biopsy.  -PSA-09/15/2022-1.52  -CT abdomen/pelvis w/wo-several stone both kidneys, irregular mural thickening posterior/inferior bladder in part due to invaginating and enlarged prostate.  -Urine cystology-02/09/2023-atypical  Urine cytology-12/07/2022-ATYPICAL  -Cystoscopy-10/10/2022-moderately obstructing prostate  Location: renal/gu  Severity: no pain  Onset / Duration: 2022  Modifying factors: moderately obstructing prostate  Associated signs and symptoms: no fever  Review of Systems  Eyes:Denies: blurry vision, pain in the eyes and double vision  ENMT:Reports: ear pain ; Denies: sore throat and sinus problems  Cardiovascular:Denies: chest pain, varicose veins, angina and syncope  Respiratory:Denies: shortness of breath, wheezing and frequent cough  Gastrointestinal:Denies: abdominal pain, nausea, vomiting, indigestion and heartburn  Genitourinary:Reports: other symptoms (see HPI)  Musculoskeletal:Reports: joint pain, neck pain and back pain  Integumentary:Reports: skin rash ; Denies: boils and persistent itch  Neurological:Reports: dizzy spells ; Denies: tremors and numbness / tingling  Psychiatric:Reports: generally satisfied with life ; Denies: depression, suicidal ideation and anxiety  Endocrine:Reports: tired/sluggish ; Denies: Excessive thirst, cold intolerance and heat intolerance  Hematologic/Lymphatic:Denies: swollen glands and blood clotting problem  Allergic/Immunologic:Denies: hayfever  Constitutional:Denies: fever, chills and weight loss  Vital Signs  VITAL SIGNS NOT TAKEN DUE TO COVID 19 RESTRICTIONS  Resp Rate: 18 (/min) Height: 72 (in)  Never smoker  Exam  General appearance: The patient appears well developed and well  nourished, in no apparent acute distress.  Ears, Nose, Mouth and Throat: hearing normal  Examination of neck: supple  Assessment of respiratory effort: Respiratory effort appears normal. No apparent distress.  Examination of abdomen: No nausea, vomiting, or diarrhea.  Digital rectal examination of prostate gland: Last PSA 1.52  Musculoskeletal: Ambulatory  Skin: normal color  Orientation: He appears alert and oriented.  Mood and affect: Mood and affect appear normal.  Data Review  Medications and chart reviewed. History and physical form/ROS reviewed and no changes noted. Previous encounter reviewed.   Assessment - Hematuria syndrome  DX:  Hematuria syndrome  SNO: [id number], ICD-10: R31.9  Benign prostatic hyperplasia with outflow obstruction  SNO: [id number], ICD-10: N40.1, ICD-10: N40.1  Kidney stone  SNO: [id number], ICD-10: N20.0  ASSESSMENT NOTES  In light of the imaging studies and the 2 abnormal cytology tests, patient encouraged to have biopsy of bladder.  Plan  Cystoscopy with bladder biopsy.  Education:  Tests instructions - English  Discussion:  Findings were discussed with the patient Copy of office note sent to PCP. I explained the tests to the patient in detail including risks, hazards and potential complications. RISKS AND BENEFITS OF PROCEDURE DISCUSSED WITH PATIENT WHO WISHES TO PROCEED.  Instructions:  Patient is instructed to call with any problems. Patient is instructed to call if the condition worsens. Patient is instructed to call with any changes in condition. Patient is instructed to call if he has any intractable pain, fever, chills, nausea, or vomiting.

## 2023-05-17 ENCOUNTER — ANESTHESIA (OUTPATIENT)
Dept: PERIOP | Facility: HOSPITAL | Age: 68
End: 2023-05-17
Payer: MEDICARE

## 2023-05-17 ENCOUNTER — HOSPITAL ENCOUNTER (OUTPATIENT)
Facility: HOSPITAL | Age: 68
Setting detail: HOSPITAL OUTPATIENT SURGERY
Discharge: HOME | End: 2023-05-17
Attending: UROLOGY | Admitting: UROLOGY
Payer: MEDICARE

## 2023-05-17 VITALS
TEMPERATURE: 96.4 F | BODY MASS INDEX: 33.18 KG/M2 | RESPIRATION RATE: 18 BRPM | DIASTOLIC BLOOD PRESSURE: 90 MMHG | HEART RATE: 59 BPM | SYSTOLIC BLOOD PRESSURE: 148 MMHG | OXYGEN SATURATION: 97 % | HEIGHT: 72 IN | WEIGHT: 244.93 LBS

## 2023-05-17 DIAGNOSIS — D49.4 BLADDER NEOPLASM: ICD-10-CM

## 2023-05-17 LAB — GLUCOSE BLDC GLUCOMTR-MCNC: 158 MG/DL (ref 70–130)

## 2023-05-17 PROCEDURE — 25010000002 FENTANYL CITRATE (PF) 50 MCG/ML SOLUTION

## 2023-05-17 PROCEDURE — 82948 REAGENT STRIP/BLOOD GLUCOSE: CPT

## 2023-05-17 PROCEDURE — 88305 TISSUE EXAM BY PATHOLOGIST: CPT

## 2023-05-17 PROCEDURE — 25010000002 CEFTRIAXONE PER 250 MG: Performed by: UROLOGY

## 2023-05-17 PROCEDURE — 25010000002 PROPOFOL 200 MG/20ML EMULSION

## 2023-05-17 PROCEDURE — 25010000002 MIDAZOLAM PER 1 MG

## 2023-05-17 PROCEDURE — 25010000002 ONDANSETRON PER 1 MG

## 2023-05-17 RX ORDER — MIDAZOLAM HYDROCHLORIDE 1 MG/ML
INJECTION INTRAMUSCULAR; INTRAVENOUS AS NEEDED
Status: DISCONTINUED | OUTPATIENT
Start: 2023-05-17 | End: 2023-05-17 | Stop reason: SURG

## 2023-05-17 RX ORDER — ONDANSETRON 2 MG/ML
INJECTION INTRAMUSCULAR; INTRAVENOUS AS NEEDED
Status: DISCONTINUED | OUTPATIENT
Start: 2023-05-17 | End: 2023-05-17 | Stop reason: SURG

## 2023-05-17 RX ORDER — FENTANYL CITRATE 50 UG/ML
INJECTION, SOLUTION INTRAMUSCULAR; INTRAVENOUS AS NEEDED
Status: DISCONTINUED | OUTPATIENT
Start: 2023-05-17 | End: 2023-05-17 | Stop reason: SURG

## 2023-05-17 RX ORDER — LEVOFLOXACIN 250 MG/1
250 TABLET ORAL DAILY
Qty: 7 TABLET | Refills: 0 | Status: SHIPPED | OUTPATIENT
Start: 2023-05-17 | End: 2023-05-24

## 2023-05-17 RX ORDER — OXYCODONE AND ACETAMINOPHEN 7.5; 325 MG/1; MG/1
1 TABLET ORAL EVERY 6 HOURS PRN
Qty: 10 TABLET | Refills: 0 | Status: SHIPPED | OUTPATIENT
Start: 2023-05-17 | End: 2023-05-24

## 2023-05-17 RX ORDER — SODIUM CHLORIDE 9 MG/ML
1000 INJECTION, SOLUTION INTRAVENOUS CONTINUOUS
Status: DISCONTINUED | OUTPATIENT
Start: 2023-05-17 | End: 2023-05-17 | Stop reason: HOSPADM

## 2023-05-17 RX ORDER — LIDOCAINE HYDROCHLORIDE 20 MG/ML
JELLY TOPICAL AS NEEDED
Status: DISCONTINUED | OUTPATIENT
Start: 2023-05-17 | End: 2023-05-17 | Stop reason: HOSPADM

## 2023-05-17 RX ORDER — PROPOFOL 10 MG/ML
INJECTION, EMULSION INTRAVENOUS AS NEEDED
Status: DISCONTINUED | OUTPATIENT
Start: 2023-05-17 | End: 2023-05-17 | Stop reason: SURG

## 2023-05-17 RX ADMIN — PROPOFOL 30 MG: 10 INJECTION, EMULSION INTRAVENOUS at 08:03

## 2023-05-17 RX ADMIN — FENTANYL CITRATE 25 MCG: 50 INJECTION, SOLUTION INTRAMUSCULAR; INTRAVENOUS at 08:10

## 2023-05-17 RX ADMIN — PROPOFOL 20 MG: 10 INJECTION, EMULSION INTRAVENOUS at 08:06

## 2023-05-17 RX ADMIN — PROPOFOL 20 MG: 10 INJECTION, EMULSION INTRAVENOUS at 08:12

## 2023-05-17 RX ADMIN — ONDANSETRON 4 MG: 2 INJECTION INTRAMUSCULAR; INTRAVENOUS at 08:07

## 2023-05-17 RX ADMIN — PROPOFOL 20 MG: 10 INJECTION, EMULSION INTRAVENOUS at 08:11

## 2023-05-17 RX ADMIN — FENTANYL CITRATE 25 MCG: 50 INJECTION, SOLUTION INTRAMUSCULAR; INTRAVENOUS at 08:03

## 2023-05-17 RX ADMIN — PROPOFOL 20 MG: 10 INJECTION, EMULSION INTRAVENOUS at 08:16

## 2023-05-17 RX ADMIN — PROPOFOL 20 MG: 10 INJECTION, EMULSION INTRAVENOUS at 08:17

## 2023-05-17 RX ADMIN — SODIUM CHLORIDE 1000 ML: 9 INJECTION, SOLUTION INTRAVENOUS at 06:26

## 2023-05-17 RX ADMIN — PROPOFOL 50 MG: 10 INJECTION, EMULSION INTRAVENOUS at 08:09

## 2023-05-17 RX ADMIN — PROPOFOL 20 MG: 10 INJECTION, EMULSION INTRAVENOUS at 08:18

## 2023-05-17 RX ADMIN — PROPOFOL 20 MG: 10 INJECTION, EMULSION INTRAVENOUS at 08:15

## 2023-05-17 RX ADMIN — MIDAZOLAM HYDROCHLORIDE 2 MG: 1 INJECTION, SOLUTION INTRAMUSCULAR; INTRAVENOUS at 07:59

## 2023-05-17 RX ADMIN — PROPOFOL 20 MG: 10 INJECTION, EMULSION INTRAVENOUS at 08:13

## 2023-05-17 NOTE — OP NOTE
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 (Active)       Continuous Bladder Irrigation Triple-lumen 22 Fr (Active)       Findings: Bladder lesion 1.0 cm at the bladder neck prostate interface 3 o'clock position    Complications: None    Indications: Same    Description of Procedure: Patient brought surgery placed in dorsolithotomy position sterile prep and drape genitalia in routine fashion passed #20 [place] scope in the bladder the bladder was negative for infection tumor or calculi should be noted there was a papillary type lesion at the bladder neck 3:00 between the bladder neck and the prostate.  I switched to 24fr scope sheath and loop with a power setting 100 cut 75 coagulation.  I resected the lesion as described location.  I remove the tissue out of the bladder cauterized the base of the resection site then placed a 22 [place] three-way back in the bladder 20 cc placed in balloon started on irrigation patient taken recovery having tolerated the procedure well    David Us MD     Date: 5/17/2023  Time: 08:24 CDT

## 2023-05-17 NOTE — ANESTHESIA POSTPROCEDURE EVALUATION
"Patient: Baljinder Blanca    Procedure Summary     Date: 05/17/23 Room / Location: [facility] OR 02 / [facility] OR    Anesthesia Start: 0758 Anesthesia Stop: 0831    Procedure: CYSTOSCOPY; WITH BLADDER BIOPSY Diagnosis:       Bladder neoplasm      (Bladder neoplasm [D49.4])    Surgeons: Abeba, David HARPER MD Provider: Nikki Strange CRNA    Anesthesia Type: general, MAC ASA Status: 3          Anesthesia Type: general, MAC    Vitals  Vitals Value Taken Time   /66 05/17/23 0831   Temp 97 °F (36.1 °C) 05/17/23 0831   Pulse 62 05/17/23 0831   Resp 16 05/17/23 0831   SpO2 95 % 05/17/23 0831           Post Anesthesia Care and Evaluation    Patient location during evaluation: PHASE II  Patient participation: complete - patient participated  Level of consciousness: awake and alert  Pain score: 0  Pain management: adequate    Airway patency: patent  Anesthetic complications: No anesthetic complications  PONV Status: none  Cardiovascular status: acceptable  Respiratory status: acceptable  Hydration status: acceptable    Comments: Blood pressure 114/66, pulse 62, temperature 97 °F (36.1 °C), temperature source Temporal, resp. rate 16, height 182.9 cm (72\"), weight 111 kg (244 lb 14.9 oz), SpO2 95 %.    No anesthesia care post op    "

## 2023-05-18 LAB
QT INTERVAL: 448 MS
QTC INTERVAL: 476 MS

## 2023-05-19 LAB — REF LAB TEST METHOD: NORMAL

## 2023-08-14 RX ORDER — SOTALOL HYDROCHLORIDE 80 MG/1
TABLET ORAL
Qty: 60 TABLET | Refills: 11 | Status: SHIPPED | OUTPATIENT
Start: 2023-08-14

## 2023-08-23 ENCOUNTER — CLINICAL SUPPORT (OUTPATIENT)
Dept: CARDIOLOGY | Facility: CLINIC | Age: 68
End: 2023-08-23
Payer: MEDICARE

## 2023-08-23 DIAGNOSIS — I49.5 SSS (SICK SINUS SYNDROME): Primary | ICD-10-CM

## 2023-08-23 NOTE — PROGRESS NOTES
Pacemaker Evaluation Report    August 23, 2023    Primary Cardiologist: Dr Ramos  Implanting MD: Dr. Ramos  : [employer]  Model: Assurity MRI [id number]  Serial Number: [id number]  Implant date: 5/26/22    Reason for evaluation:routine PPM Office  Cardiac device indication(s): sick sinus syndrome    Battery  HUMA: 9-9.5 years    Interrogation Results  Atrial sensing: >5mV  Atrial capture: 0.5 V - 0.5 ms  Atrial impedance: 450 ohms  Ventricular sensing: dependent  Ventricular capture: 1.0 V @ 0.5 ms  Ventricular lead impedance: right  450 ohms    Parameters  Mode: DDDR  Base Rate: 60    Diagnostic Data:  Atrial paced: 19%  Ventricular paced: >99 %  Mode switch: 3.3%  AT/AF [place]: 3.3%  AHR: 10  VHR: 1    Intrinsic Rate: dependent    Changes made:  PVARP increased to 350 ms.    Conclusions: normal device function. Follow up in one year, remote every 3 months.    Assessment    Sick sinus syndrome  Normal function pacemaker pacing the ventricle greater than 99%  Paroxysmal atrial fibrillation on anticoagulation                This document has been electronically signed by Gautam Ramos MD on August 25, 2023 16:22 CDT

## 2024-10-16 NOTE — TELEPHONE ENCOUNTER
refill  
Detail Level: Detailed
Quality 137: Melanoma: Continuity Of Care - Recall System: Patient information entered into a recall system that includes: target date for the next exam specified AND a process to follow up with patients regarding missed or unscheduled appointments
When Should The Patient Follow-Up For Their Next Full-Body Skin Exam?: 1 Year

## (undated) DEVICE — ELECTRODE,RT,STRESS,FOAM,50PK: Brand: MEDLINE

## (undated) DEVICE — KT INTRO MINISTICK MAX W/GW NITNL/TUNG ECHO 4F 21G 7CM

## (undated) DEVICE — TBG PENCL TELESCP MEGADYNE SMOKE EVAC 10FT

## (undated) DEVICE — PK PM 60